# Patient Record
Sex: MALE | Race: WHITE
[De-identification: names, ages, dates, MRNs, and addresses within clinical notes are randomized per-mention and may not be internally consistent; named-entity substitution may affect disease eponyms.]

---

## 2021-10-26 ENCOUNTER — HOSPITAL ENCOUNTER (EMERGENCY)
Dept: HOSPITAL 61 - ER | Age: 66
LOS: 1 days | Discharge: HOME | End: 2021-10-27
Payer: MEDICARE

## 2021-10-26 VITALS — WEIGHT: 211.64 LBS | HEIGHT: 70 IN | BODY MASS INDEX: 30.3 KG/M2

## 2021-10-26 DIAGNOSIS — R42: ICD-10-CM
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
## 2021-10-26 DIAGNOSIS — R51.9: ICD-10-CM

## 2021-10-26 DIAGNOSIS — S93.05XA: ICD-10-CM

## 2021-10-26 DIAGNOSIS — S93.02XA: ICD-10-CM
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
## 2021-10-26 DIAGNOSIS — S82.62XA: Primary | ICD-10-CM

## 2021-10-26 DIAGNOSIS — Z95.1: ICD-10-CM
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
## 2021-10-26 DIAGNOSIS — S82.842A: ICD-10-CM

## 2021-10-26 DIAGNOSIS — M54.2: ICD-10-CM

## 2021-10-26 PROCEDURE — 74177 CT ABD & PELVIS W/CONTRAST: CPT

## 2021-10-26 PROCEDURE — 99285 EMERGENCY DEPT VISIT HI MDM: CPT

## 2021-10-26 PROCEDURE — 96375 TX/PRO/DX INJ NEW DRUG ADDON: CPT

## 2021-10-26 PROCEDURE — 93005 ELECTROCARDIOGRAM TRACING: CPT

## 2021-10-26 PROCEDURE — 71260 CT THORAX DX C+: CPT

## 2021-10-26 PROCEDURE — 84484 ASSAY OF TROPONIN QUANT: CPT

## 2021-10-26 PROCEDURE — 90715 TDAP VACCINE 7 YRS/> IM: CPT

## 2021-10-26 PROCEDURE — 82962 GLUCOSE BLOOD TEST: CPT

## 2021-10-26 PROCEDURE — 90471 IMMUNIZATION ADMIN: CPT

## 2021-10-26 PROCEDURE — 96361 HYDRATE IV INFUSION ADD-ON: CPT

## 2021-10-26 PROCEDURE — 73590 X-RAY EXAM OF LOWER LEG: CPT

## 2021-10-26 PROCEDURE — 85025 COMPLETE CBC W/AUTO DIFF WBC: CPT

## 2021-10-26 PROCEDURE — 73600 X-RAY EXAM OF ANKLE: CPT

## 2021-10-26 PROCEDURE — 73610 X-RAY EXAM OF ANKLE: CPT

## 2021-10-26 PROCEDURE — 80053 COMPREHEN METABOLIC PANEL: CPT

## 2021-10-26 PROCEDURE — 36415 COLL VENOUS BLD VENIPUNCTURE: CPT

## 2021-10-26 PROCEDURE — 96374 THER/PROPH/DIAG INJ IV PUSH: CPT

## 2021-10-26 PROCEDURE — 27788 TREATMENT OF ANKLE FRACTURE: CPT

## 2021-10-26 PROCEDURE — 73562 X-RAY EXAM OF KNEE 3: CPT

## 2021-10-26 PROCEDURE — 72125 CT NECK SPINE W/O DYE: CPT

## 2021-10-26 PROCEDURE — 70450 CT HEAD/BRAIN W/O DYE: CPT

## 2021-10-26 PROCEDURE — 73620 X-RAY EXAM OF FOOT: CPT

## 2021-10-27 VITALS — SYSTOLIC BLOOD PRESSURE: 112 MMHG | DIASTOLIC BLOOD PRESSURE: 59 MMHG

## 2021-10-27 LAB
ALBUMIN SERPL-MCNC: 3.6 G/DL (ref 3.4–5)
ALBUMIN/GLOB SERPL: 1.3 {RATIO} (ref 1–1.7)
ALP SERPL-CCNC: 41 U/L (ref 46–116)
ALT SERPL-CCNC: 31 U/L (ref 16–63)
ANION GAP SERPL CALC-SCNC: 12 MMOL/L (ref 6–14)
AST SERPL-CCNC: 28 U/L (ref 15–37)
BASOPHILS # BLD AUTO: 0 X10^3/UL (ref 0–0.2)
BASOPHILS NFR BLD: 0 % (ref 0–3)
BILIRUB SERPL-MCNC: 0.3 MG/DL (ref 0.2–1)
BUN SERPL-MCNC: 9 MG/DL (ref 8–26)
BUN/CREAT SERPL: 13 (ref 6–20)
CALCIUM SERPL-MCNC: 7.8 MG/DL (ref 8.5–10.1)
CHLORIDE SERPL-SCNC: 100 MMOL/L (ref 98–107)
CO2 SERPL-SCNC: 24 MMOL/L (ref 21–32)
CREAT SERPL-MCNC: 0.7 MG/DL (ref 0.7–1.3)
EOSINOPHIL NFR BLD: 0 % (ref 0–3)
EOSINOPHIL NFR BLD: 0 X10^3/UL (ref 0–0.7)
ERYTHROCYTE [DISTWIDTH] IN BLOOD BY AUTOMATED COUNT: 14.8 % (ref 11.5–14.5)
GFR SERPLBLD BASED ON 1.73 SQ M-ARVRAT: 112.1 ML/MIN
GLUCOSE SERPL-MCNC: 105 MG/DL (ref 70–99)
HCT VFR BLD CALC: 34.9 % (ref 39–53)
HGB BLD-MCNC: 11.6 G/DL (ref 13–17.5)
LYMPHOCYTES # BLD: 0.6 X10^3/UL (ref 1–4.8)
LYMPHOCYTES NFR BLD AUTO: 12 % (ref 24–48)
MCH RBC QN AUTO: 29 PG (ref 25–35)
MCHC RBC AUTO-ENTMCNC: 33 G/DL (ref 31–37)
MCV RBC AUTO: 87 FL (ref 79–100)
MONO #: 0.3 X10^3/UL (ref 0–1.1)
MONOCYTES NFR BLD: 6 % (ref 0–9)
NEUT #: 4.3 X10^3/UL (ref 1.8–7.7)
NEUTROPHILS NFR BLD AUTO: 82 % (ref 31–73)
PLATELET # BLD AUTO: 141 X10^3/UL (ref 140–400)
POTASSIUM SERPL-SCNC: 4.4 MMOL/L (ref 3.5–5.1)
PROT SERPL-MCNC: 6.4 G/DL (ref 6.4–8.2)
RBC # BLD AUTO: 4.03 X10^6/UL (ref 4.3–5.7)
SODIUM SERPL-SCNC: 136 MMOL/L (ref 136–145)
WBC # BLD AUTO: 5.2 X10^3/UL (ref 4–11)

## 2021-10-27 NOTE — RAD
Examination: CT head and cervical spine without contrast





CT HEAD



INDICATION: Fall, pain 



COMPARISON: None Available.



Exposure: One or more of the following individualized dose reduction techniques were utilized for thi
s examination:  1. Automated exposure control  2. Adjustment of the mA and/or kV according to patient
 size  3. Use of iterative reconstruction technique



TECHNIQUE: 5 mm contiguous axial images were obtained from the skull base to the vertex in both bone 
and soft tissue algorithm.  



FINDINGS: 

No abnormal attenuation within the brain parenchyma.  



No evidence of acute intracranial hemorrhage.   No extra-axial fluid collections.



No mass effect or midline shift. Ventricular size is appropriate.  Basal cisterns are patent.



No fractures identified.Gray-white differentiation is preserved.Globes and orbits are within normal l
imits.   Paranasal sinuses and mastoid air cells are clear.   







CT CERVICAL SPINE



INDICATION: Fall, pain



COMPARISON: None Available.



Technique: 2.5 mm contiguous axial images were obtained from the skull base through the cervicothorac
ic junction in both bone and soft tissue algorithm.  Additional sagittal and coronal reconstructions 
were also performed. 



FINDINGS: Vertebral body height and alignment are maintained.  Cervical lordosis is preserved.  The l
ateral masses of C1 are aligned upon C2.  



No fractures identified.  The bony canal is patent throughout.



Moderate intervertebral disc height loss identified in the cervical spine particularly at C4-C5, C5-C
6, C6-C7 with small anterior and posterior osteophyte formation.  



The paraspinous soft tissues are unremarkable.  Visualized intracranial contents are unremarkable.  L
sandrine apices are clear. 



IMPRESSION:



1.  No acute intracranial findings.

2.  No acute fracture cervical spine. Correlate clinically.

3.  Moderate degenerative changes cervical spine.



Electronically signed by: Franki Armando MD (10/27/2021 12:44 AM) [id number]

## 2021-10-27 NOTE — RAD
Examination: 3 views of the left ankle



HISTORY: History of postreduction



COMPARISON: Same day exam 



Findings/

impression:



 Mild lateral displaced fracture of the lateral malleolus again identified. There is persistent later
al subluxation of the talus in the ankle joint which is slightly improved since prior exam.



Electronically signed by: Franki Armando MD (10/27/2021 2:34 AM) [id number]

## 2021-10-27 NOTE — RAD
Examination: CT chest abdomen pelvis with IV contrast, CT thoracic and lumbar spine without contrast





Indication: Reason: fall intox / Spl. Instructions:  / History: 



Technique: Contiguous axial images were obtained through the chest, abdomen, and pelvis after adminis
tration IV contrast. Coronal and sagittal reformations were created. Axial CT images of the thoracic 
and lumbar spine was performed without contrast and coronal sagittal reformats are performed.



Comparison: None



Findings:





The visualized thyroid gland grossly appears unremarkable. Central airways are patent. Mild cardiomeg
melonie. The ascending aorta measures 5 cm in transverse dimension. Neurologically significant mediastina
l lymphadenopathy identified. Mild bibasilar lung atelectasis. Mild decreased attenuation noted in th
e liver likely hepatic steatosis. The spleen, adrenals grossly appears unremarkable. The gallbladder 
is mildly distended. Surgical changes identified in the stomach. The small bowel is nondilated. Feces
 and gas noted in the colon.. Urinary bladder is mildly distended



The bilateral kidneys enhance symmetrically. Minimal compression changes identified in the mid thorac
ic vertebral bodies T7, T8, T9 vertebral bodies likely chronic changes. Moderate intervertebral disc 
height loss identified in the thoracic and lumbar spine likely degenerative changes. Mild thoracic ky
phosis. Bilateral total hip arthroplasty changes.



IMPRESSION:

1.  No acute traumatic findings identified in the chest, abdomen or pelvis.

2.  Mild aneurysmal change ascending aorta.

3.  Hepatic steatosis.

4.  Minimal compression changes identified in the mid thoracic vertebral bodies T7, T8, T9 vertebral 
bodies likely chronic changes.



Electronically signed by: Franki Amrando MD (10/27/2021 4:52 AM) [id number]

## 2021-10-27 NOTE — RAD
Two-view left ankle x2



HISTORY: Status post reduction



Two-view left ankle 1:32 AM



Limited 2 view AP lateral views



COMPARISON: 1:27 AM



There is an oblique fracture of the lateral malleolus at and above the level plafond and with mild la
teral and posterior displacement. There is widening of the medial mortise with lateral subluxation of
 the talus from the tibia.



IMPRESSION:



Bimalleolar fracture with widening of the medial mortise. There is slightly improved anatomic relatio
nship.



2 view left ankle: 3:11 AM



Limited 2 view AP lateral views of the left ankle were obtained



There is fracture of the lateral malleolus at and above the level plafond and with lateral displaceme
nt and there is significant widening of the medial mortise with lateral subluxation of the talus from
 the tibia and valgus angulation.



IMPRESSION:



Bimalleolar fracture dislocation appears mildly worse.



Electronically signed by: Steve Felix III, MD (10/27/2021 7:22 AM) [facility]

## 2021-10-27 NOTE — RAD
Examination: 3 views of the right knee



HISTORY: History of fall



COMPARISON: None available.



Findings: The alignment of the knee joint grossly appears unremarkable. Mild joint space loss identif
ied in the medial, lateral, patellofemoral compartments.



IMPRESSION:



1. Mild tricompartmental degenerative changes.



Electronically signed by: Franki Armando MD (10/27/2021 2:37 AM) [id number]

## 2021-10-27 NOTE — RAD
Examination: CT chest abdomen pelvis with IV contrast, CT thoracic and lumbar spine without contrast





Indication: Reason: fall intox / Spl. Instructions:  / History: 



Technique: Contiguous axial images were obtained through the chest, abdomen, and pelvis after adminis
tration IV contrast. Coronal and sagittal reformations were created. Axial CT images of the thoracic 
and lumbar spine was performed without contrast and coronal sagittal reformats are performed.



Comparison: None



Findings:





The visualized thyroid gland grossly appears unremarkable. Central airways are patent. Mild cardiomeg
melonie. The ascending aorta measures 5 cm in transverse dimension. Neurologically significant mediastina
l lymphadenopathy identified. Mild bibasilar lung atelectasis. Mild decreased attenuation noted in th
e liver likely hepatic steatosis. The spleen, adrenals grossly appears unremarkable. The gallbladder 
is mildly distended. Surgical changes identified in the stomach. The small bowel is nondilated. Feces
 and gas noted in the colon.. Urinary bladder is mildly distended



The bilateral kidneys enhance symmetrically. Minimal compression changes identified in the mid thorac
ic vertebral bodies T7, T8, T9 vertebral bodies likely chronic changes. Moderate intervertebral disc 
height loss identified in the thoracic and lumbar spine likely degenerative changes. Mild thoracic ky
phosis. Bilateral total hip arthroplasty changes.



IMPRESSION:

1.  No acute traumatic findings identified in the chest, abdomen or pelvis.

2.  Mild aneurysmal change ascending aorta.

3.  Hepatic steatosis.

4.  Minimal compression changes identified in the mid thoracic vertebral bodies T7, T8, T9 vertebral 
bodies likely chronic changes.



Electronically signed by: Franki Armando MD (10/27/2021 4:52 AM) [id number]

## 2021-10-27 NOTE — PHYS DOC
Past Medical History


Past Surgical History:  No Surgical History





General Adult


EDM:


Chief Complaint:  ANKLE PROBLEM





HPI:


HPI:


66-year-old male who initially denies any past medical history, resents the ED 

with complaints of left ankle pain stating he rolled his ankle just prior to 

arrival.  Wife is later present at bedside, (patient consents to his/her/their 

knowledge and involvement in pts' medical care), and states she heard a loud 

noise and found patient on the floor.  Patient reports he did not hit his head 

or lose consciousness. States "I tried to walk on it and couldn't." Has a 

history of bilateral hip placements, gastric bypass surgery in  and reports 

he has sweating and dizzy spells, worse at night for the past 4 years.  Does 

drink alcohol daily, 1 bottle of wine a day.  Reports he was currently drinking 

alcohol earlier tonight. No prior injury to his left ankle. Denies any head 

injury or loss of consciousness.  Is not on any anticoagulants.  Takes no 

routine daily prescribed medications.  Denies any other coingestants.  Cannot 

recall last tetanus.





Review of Systems:


Review of Systems:


Constitutional:   Denies fever or chills. []


Eyes:   Denies change in visual acuity. []


HENT:   Denies nasal congestion or sore throat. [] 


Respiratory:   Denies cough or shortness of breath. [] 


Cardiovascular:   Denies chest pain or edema. [] 


GI:   Denies abdominal pain, nausea, vomiting, bloody stools or diarrhea. [] 


:  Denies incontinence or saddle anesthesia


Musculoskeletal:   Denies mildline back pain or flank pain. [] 


Integument:   Denies rash or diaphoresis


Neurologic:   Denies headache, focal weakness or sensory changes. [] 


Endocrine:   Denies polyuria or polydipsia. [] 


Lymphatic:  Denies swollen glands. [] 


Psychiatric:  Denies depression or anxiety. []





Heart Score:


C/O Chest Pain:  No


Risk Factors:


Risk Factors:  DM, Current or recent (<one month) smoker, HTN, HLP, family 

history of CAD, obesity.


Risk Scores:


Score 0 - 3:  2.5% MACE over next 6 weeks - Discharge Home


Score 4 - 6:  20.3% MACE over next 6 weeks - Admit for Clinical Observation


Score 7 - 10:  72.7% MACE over next 6 weeks - Early Invasive Strategies





Current Medications:





Current Medications








 Medications


  (Trade)  Dose


 Ordered  Sig/Mitchlel  Start Time


 Stop Time Status Last Admin


Dose Admin


 


 Diphtheria/


 Tetanus/Acell


 Pertussis


  (ADACEL TDap


 SYRINGE)  0.5 ml  ONCE ONCE  10/27/21 01:00


 10/27/21 01:01 DC 10/27/21 00:54


0.5 ML


 


 Hydromorphone HCl


  (Dilaudid)  1 mg  1X  ONCE  10/27/21 01:00


 10/27/21 01:01 DC 10/27/21 00:52


1 MG


 


 Ketamine HCl


  (Ketamine)  120 mg  1X  ONCE  10/27/21 01:00


 10/27/21 01:01 DC 10/27/21 00:54


120 MG


 


 Sodium Chloride  1,000 ml @ 


 1,000 mls/hr  1X  ONCE  10/27/21 01:30


 10/27/21 02:29  10/27/21 01:26


1,000 MLS/HR











Allergies:


Allergies:





Allergies








Coded Allergies Type Severity Reaction Last Updated Verified


 


  No Known Drug Allergies    10/27/21 No











Physical Exam:


PE:





Constitutional: Well developed, well nourished, no acute distress, non-toxic 

appearance. 


HENT: Normocephalic, atraumatic, moist mucous membranes


Eyes: PERRLA, EOMI, conjunctiva normal, no discharge.  


Neck: Normal range of motion,  supple, no midline neck pain


Cardiovascular: S1/2 present, regular rhythm


Lungs & Thorax: Speaking in full sentences, bilateral equal chest rise, no 

tachypnea or increased work of breathing


Abdomen:  soft, no tenderness, 


Skin: Warm, dry, no erythema, no rash. [] 


Back: No midline spinal step-offs or tenderness, no CVA tenderness. [] 


Extremities: Complains of left ankle pain with weightbearing, no pain in either 

knee or fibular head but both anterior knees have superficial abrasions, left 

ankle with obvious deformity/significant contusions over medial malleoli w/skin 

tenting and superficial skin abrasion, dp/pt intact bl, L5/S1 intact bl, cap 

refill < 1 second, patient moves left ankle/knee hip without any grimace


Neurologic: Alert and oriented X 3, normal motor function, normal sensory 

function, no focal deficits noted, ataxic movements


Psychologic: Affect normal, judgement normal, mood normal. []





Current Patient Data:


Labs:





                                Laboratory Tests








Test


 10/27/21


00:40


 


White Blood Count


 5.2 x10^3/uL


(4.0-11.0)


 


Red Blood Count


 4.03 x10^6/uL


(4.30-5.70)  L


 


Hemoglobin


 11.6 g/dL


(13.0-17.5)  L


 


Hematocrit


 34.9 %


(39.0-53.0)  L


 


Mean Corpuscular Volume


 87 fL ()





 


Mean Corpuscular Hemoglobin 29 pg (25-35)  


 


Mean Corpuscular Hemoglobin


Concent 33 g/dL


(31-37)


 


Red Cell Distribution Width


 14.8 %


(11.5-14.5)  H


 


Platelet Count


 141 x10^3/uL


(140-400)


 


Neutrophils (%) (Auto) 82 % (31-73)  H


 


Lymphocytes (%) (Auto) 12 % (24-48)  L


 


Monocytes (%) (Auto) 6 % (0-9)  


 


Eosinophils (%) (Auto) 0 % (0-3)  


 


Basophils (%) (Auto) 0 % (0-3)  


 


Neutrophils # (Auto)


 4.3 x10^3/uL


(1.8-7.7)


 


Lymphocytes # (Auto)


 0.6 x10^3/uL


(1.0-4.8)  L


 


Monocytes # (Auto)


 0.3 x10^3/uL


(0.0-1.1)


 


Eosinophils # (Auto)


 0.0 x10^3/uL


(0.0-0.7)


 


Basophils # (Auto)


 0.0 x10^3/uL


(0.0-0.2)


 


Sodium Level


 136 mmol/L


(136-145)


 


Potassium Level


 4.4 mmol/L


(3.5-5.1)


 


Chloride Level


 100 mmol/L


()


 


Carbon Dioxide Level


 24 mmol/L


(21-32)


 


Anion Gap 12 (6-14)  


 


Blood Urea Nitrogen


 9 mg/dL (8-26)





 


Creatinine


 0.7 mg/dL


(0.7-1.3)


 


Estimated GFR


(Cockcroft-Gault) 112.1  





 


BUN/Creatinine Ratio 13 (6-20)  


 


Glucose Level


 105 mg/dL


(70-99)  H


 


Calcium Level


 7.8 mg/dL


(8.5-10.1)  L


 


Total Bilirubin


 0.3 mg/dL


(0.2-1.0)


 


Aspartate Amino Transferase


(AST) 28 U/L (15-37)





 


Alanine Aminotransferase (ALT)


 31 U/L (16-63)





 


Alkaline Phosphatase


 41 U/L


()  L


 


Total Protein


 6.4 g/dL


(6.4-8.2)


 


Albumin


 3.6 g/dL


(3.4-5.0)


 


Albumin/Globulin Ratio 1.3 (1.0-1.7)  


 


Ethyl Alcohol Level


 241 mg/dL


(0-10)  H





                                Laboratory Tests


10/27/21 00:40








                                Laboratory Tests


10/27/21 00:40








Vital Signs:





                                   Vital Signs








  Date Time  Temp Pulse Resp B/P (MAP) Pulse Ox O2 Delivery O2 Flow Rate FiO2


 


10/27/21 00:52   18  98   


 


10/27/21 00:15 98.3 98  122/61 (81)  Room Air  





 98.3       











EKG:


EKG:


Sinus rhythm 59 bpm, left axis deviation, first-degree AV block with IA interval

 202, QTc 46, T wave inversion lead III and aVF, no ST elevation or ST depres

tonia, patient does not have any chest pain





Radiology/Procedures:


Radiology/Procedures:


                                 IMAGING REPORT





                                     Signed





PATIENT: MERLE MCALLISTERCCOUNT: [id number]     MRN#: [id number]


: 1955           LOCATION: ER              AGE: 66


SEX: M                    EXAM DT: 10/27/21         ACCESSION#: [id number]


STATUS: REG ER            ORD. PHYSICIAN: KIM RAMOS DO


REASON: ankle pain


PROCEDURE: CT HEAD AND CERVICAL SPINE WO





Examination: CT head and cervical spine without contrast








CT HEAD





INDICATION: Fall, pain 





COMPARISON: None Available.





Exposure: One or more of the following individualized dose reduction techniques 

were utilized for this examination:  1. Automated exposure control  2. 

Adjustment of the mA and/or kV according to patient size  3. Use of iterative 

reconstruction technique





TECHNIQUE: 5 mm contiguous axial images were obtained from the skull base to the

 vertex in both bone and soft tissue algorithm.  





FINDINGS: 


No abnormal attenuation within the brain parenchyma.  





No evidence of acute intracranial hemorrhage.   No extra-axial fluid 

collections.





No mass effect or midline shift. Ventricular size is appropriate.  Basal cist

erns are patent.





No fractures identified.Gray-white differentiation is preserved.Globes and 

orbits are within normal limits.   Paranasal sinuses and mastoid air cells are 

clear.   











CT CERVICAL SPINE





INDICATION: Fall, pain





COMPARISON: None Available.





Technique: 2.5 mm contiguous axial images were obtained from the skull base 

through the cervicothoracic junction in both bone and soft tissue algorithm.  

Additional sagittal and coronal reconstructions were also performed. 





FINDINGS: Vertebral body height and alignment are maintained.  Cervical lordosis

 is preserved.  The lateral masses of C1 are aligned upon C2.  





No fractures identified.  The bony canal is patent throughout.





Moderate intervertebral disc height loss identified in the cervical spine 

particularly at C4-C5, C5-C6, C6-C7 with small anterior and posterior osteophyte

 formation.  





The paraspinous soft tissues are unremarkable.  Visualized intracranial contents

 are unremarkable.  Lung apices are clear. 





IMPRESSION:





1.  No acute intracranial findings.


2.  No acute fracture cervical spine. Correlate clinically.


3.  Moderate degenerative changes cervical spine.





Electronically signed by: Franki Armando MD (10/27/2021 12:44 AM) [id number]














DICTATED and SIGNED BY:     FRANKI ARMANDO MD


DATE:     10/27/21 [id number] 0


                                 IMAGING REPORT





                                     Signed





PATIENT: MERLE MCALLISTERCCOUNT: [id number]     MRN#: [id number]


: 1955           LOCATION: ER              AGE: 66


SEX: M                    EXAM DT: 10/27/21         ACCESSION#: [id number]


STATUS: REG ER            ORD. PHYSICIAN: KIM RAMOS DO


REASON: ankle pain


PROCEDURE: FOOT LEFT 2V





Examination: 3 views of the left knee, 2 views of the left tibia and fibula, 3 

views of the left ankle, 2 views of the left foot





HISTORY: History of fall, ankle pain





COMPARISON: Available





FINDINGS:





The alignment of the knee joint grossly appears unremarkable. Small knee joint 

effusion. There is mild lateral displaced fracture of the lateral malleolus. 

There is lateral subluxation/dislocation of the talus in the ankle joint with 

widening of the medial clear space. Moderate soft tissue swelling identified 

lateral to lateral malleolus. Mild hallux valgus. The alignment of the tarsal 

bones, tarsometatarsal joints, metatarsophalangeal, interphalangeal grossly 

appears unremarkable.





IMPRESSION:


Mild lateral displaced fracture of the lateral malleolus with lateral 

subluxation/dislocation of the talus in the ankle joint with widening of the 

medial clear space. Moderate soft tissue swelling lateral to lateral malleolus.





Electronically signed by: Franki Armando MD (10/27/2021 1:09 AM) [email or web address]














DICTATED and SIGNED BY:     FRANKI ARMANDO MD


DATE:     10/27/21 [id number] 0


                                 IMAGING REPORT





                                     Signed





PATIENT: LISA MCALLISTER: [id number]     MRN#: [id number]


: 1955           LOCATION: ER              AGE: 66


SEX: M                    EXAM DT: 10/27/21         ACCESSION#: [id number]


STATUS: REG ER            ORD. PHYSICIAN: KIM RAMOS DO


REASON: POST REDUCTION #1


PROCEDURE: ANKLE LEFT 3V





Examination: 3 views of the left ankle





HISTORY: History of postreduction





COMPARISON: Same day exam 





Findings/


impression:





 Mild lateral displaced fracture of the lateral malleolus again identified. 

There is persistent lateral subluxation of the talus in the ankle joint which is

 slightly improved since prior exam.





Electronically signed by: Franki Armando MD (10/27/2021 2:34 AM) [email or web address]














DICTATED and SIGNED BY:     FRANKI ARMANDO MD


DATE:     10/27/21 [id number] 0


                                 IMAGING REPORT





                                     Signed





PATIENT: LISA MCALLISTER: [id number]     MRN#: [id number]


: 1955           LOCATION: ER              AGE: 66


SEX: M                    EXAM DT: 10/27/21         ACCESSION#: [id number]


STATUS: REG ER            ORD. PHYSICIAN: KIM RAMOS DO


REASON: FALL


PROCEDURE: KNEE RIGHT 3V





Examination: 3 views of the right knee





HISTORY: History of fall





COMPARISON: None available.





Findings: The alignment of the knee joint grossly appears unremarkable. Mild 

joint space loss identified in the medial, lateral, patellofemoral compartments.





IMPRESSION:





1. Mild tricompartmental degenerative changes.





Electronically signed by: Franki Armando MD (10/27/2021 2:37 AM) [email or web address]














DICTATED and SIGNED BY:     FRANKI ARMANDO MD


DATE:     10/27/21 [id number] 0


                                        


                                 IMAGING REPORT





                                     Signed





PATIENT: LISA MCALLISTER: [id number]     MRN#: [id number]


: 1955           LOCATION: ER              AGE: 66


SEX: M                    EXAM DT: 10/27/21         ACCESSION#: [id number]


STATUS: REG ER            ORD. PHYSICIAN: KIM RAMOS DO


REASON: intox fall, r/o trauma, htyoptensiove


PROCEDURE: CT CHEST ABD PELVIS W/CONTRAST





Examination: CT chest abdomen pelvis with IV contrast, CT thoracic and lumbar 

spine without contrast








Indication: Reason: fall intox / Spl. Instructions:  / History: 





Technique: Contiguous axial images were obtained through the chest, abdomen, and

 pelvis after administration IV contrast. Coronal and sagittal reformations were

 created. Axial CT images of the thoracic and lumbar spine was performed without

 contrast and coronal sagittal reformats are performed.





Comparison: None





Findings:








The visualized thyroid gland grossly appears unremarkable. Central airways are 

patent. Mild cardiomegaly. The ascending aorta measures 5 cm in transverse 

dimension. Neurologically significant mediastinal lymphadenopathy identified. 

Mild bibasilar lung atelectasis. Mild decreased attenuation noted in the liver 

likely hepatic steatosis. The spleen, adrenals grossly appears unremarkable. The

 gallbladder is mildly distended. Surgical changes identified in the stomach. 

The small bowel is nondilated. Feces and gas noted in the colon.. Urinary 

bladder is mildly distended





The bilateral kidneys enhance symmetrically. Minimal compression changes 

identified in the mid thoracic vertebral bodies T7, T8, T9 vertebral bodies 

likely chronic changes. Moderate intervertebral disc height loss identified in 

the thoracic and lumbar spine likely degenerative changes. Mild thoracic 

kyphosis. Bilateral total hip arthroplasty changes.





IMPRESSION:


1.  No acute traumatic findings identified in the chest, abdomen or pelvis.


2.  Mild aneurysmal change ascending aorta.


3.  Hepatic steatosis.


4.  Minimal compression changes identified in the mid thoracic vertebral bodies 

T7, T8, T9 vertebral bodies likely chronic changes.





Electronically signed by: Franki Armando MD (10/27/2021 4:52 AM) [email or web address]














DICTATED and SIGNED BY:     FRANKI ARMANDO MD


DATE:     10/27/21 [id number] 0





Indication: Joint dislocation





Consent: Consent was obtained.





Procedure: The pre-reduction exam showed distal perfusion and neurologic 

function to be normal.. The patient was placed in the appropriate position. 

Anesthesia/pain control with dilaudid (was heavily intoxicated and tolerated 

reduction without any procedural sedation). Reduction of the of the left lateral

 ankle was performed by distraction and medial movement of talus. Post reduction

 films were obtained and revealed slight improvement of subluxed talus. A post-

reduction exam revealed distal perfusion and neurologic function to be normal. 

The affected area was immobilized with a posterior ankle splint and stirrup 

splint. Crutches provided





The patient tolerated the procedure well.





Complications: none.











Patient and wife informed of findings.  Splint applied by medic and myself.  The

 splint is checked by myself, with appropriate stabilization of the injury.  

Distal capillary refill normal and distal neurologic function intact. Pt later 

c/o his foot "falling asleep" and splint was re-wrapped with resolution of 

symptoms.





Course & Med Decision Making:


Course & Med Decision Making


Pertinent Labs and Imaging studies reviewed. (See chart for details)





Concern for closed left lateral malleoli fracture with significant ankle 

contusions and talar subluxation-unable to improve this after multiple attempts-

patient continues to move left ankle with no significant pain.  Patient became 

hypotensive after 1 mg of Dilaudid and a fluid bolus was started.  Hours later 

patient became more hypotensive and a 2000 cc bolus was administered. Pt was 

re-scanned to assess for trauma. Pt continued to have no back or abdominal pain,

 was neurovascularly intact with no severe pain of left lower extremity. Ct 

images not concerning for any new trauma or hemorrhage.  Patient's blood 

pressure improved, could be vasovagal near syncope versus alcohol related. Pt 

provided with crutches and analgesic instructions. Will discharge home with 

strict ED return precautions were given for severe pain, repeat injury, 

neurologic deficits, confusion or altered mental status. Encouraged urgent 

outpatient follow-up with PMD and orthopedic surgery within 1 week.  Life-

threatening processes were considered but are low suspicion at this time, given 

history, physical exam and ED workup. Pt was educated on all prescription 

medications and adverse effects.  All patient's questions were answered and pt 

was stable at time of discharge.





Life/limb-threatening differential includes but is not limited to, intracranial 

hemorrhage, diffuse axonal injury, spinal cord syndrome, unstable cervical 

fracture or SCIWORA, fractures or joint dislocations, neurovascular injuries, 

organ injury or laceration, pneumothorax, pneumoperitoneum, pericardial 

tamponade, unstable pelvic fracture, compartment syndrome, flail chest or 

respiratory distress, burn injury or asphyxiation





I have spoken with the patient and/or caregivers.  I explained the patient's 

condition, diagnoses and treatment plan based on the information available to me

 at this time.  I have answered the patient and/or caregiver's questions and 

addressed any concerns.  The patient and/or caregivers have a good understanding

 of patient's diagnosis, condition and treatment plan as can be expected at this

 point.  Vital signs have been stable.  Patient's condition is stable and 

appropriate for discharge from the emergency department. 





Patient will pursue further outpatient evaluation with primary care physician or

 other designated or consulting physician as outlined in the discharge 

instructions.  The patient and/or caregivers are agreeable to this plan of care 

and follow-up instructions have been explained in detail.  The patient and/or 

caregivers have received these instructions in written form and have expressed 

an understanding of the discharge instructions.  The patient and/or caregivers 

are aware that any significant change of condition or worsening of symptoms 

should prompt immediate return to this or the closest emergency department or 

call to 911.





Jennifer Disclaimer:


Dragon Disclaimer:


This electronic medical record was generated, in whole or in part, using a voice

 recognition dictation system.





Departure


Departure


Impression:  


   Primary Impression:  


   Alcohol intoxication


   Additional Impressions:  


   Need for Tdap vaccination


   Fracture of lateral malleolus of left ankle


   Subluxation of left ankle joint


Disposition:   HOME / SELF CARE / HOMELESS


Condition:  STABLE


Referrals:  


BOOGIE LIPSCOMB (PCP)


Follow-up with your primary care physician for routine care OR


FOLLOW UP WITH FAMILY MEDICINE:


[address] [facility], [address]


[place] [address]


Phone: [phone]


Patient Instructions:  Alcohol Intoxication, Ankle Dislocation, Displaced 

Fibular Fracture (Adult, Ankle) Treated with ORIF, VIS, Tetanus, Diphtheria 

(Td); Tetanus, Diphtheria, Pertussis (Tdap) - CDC





Additional Instructions:  


FOLLOW UP WITH ORTHOPEDICS:  FOR DEFINITIVE MANAGEMENT of ankle fracture within 

1 week 


Orthopaedic Surgery


[address], [address]


[place] [address]


Phone: [phone]





EMERGENCY DEPARTMENT GENERAL DISCHARGE INSTRUCTIONS





Thank you for coming to [facility] Emergency Department (ED) 

today and 


trusting us with you care.  We trust that you had a positive experience in our 

Emergency 


Department.  If you wish to speak to the department management, you may call the

 Director at 


[phone].





YOUR FOLLOW UP INSTRUCTIONS ARE AS FOLLOWS:





1.  Do you have a private Doctor?  If you do not have a private doctor, please 

ask for a 


resource list of physicians or clinics that may be able to assist you with 

follow up care.





2.  The Emergency Physicain has interpreted your x-rays.  The X-Ray specialist 

will also 


review them.  If there is a change in the findings, you will be notified in 48 

hours when at 


all possible.





3.  A lab test or culture has been done, your results will be reviewed and you 

will be 


notified if you need a change in treatment.





ADDITIONAL INSTRUCTIONS AND INFORMATION:





1.  Your care today has been supervised by a physician who is specially trained 

in emergency 


care.  Many problems require more than one evaluation for a complete diagnosis 

and 


treatment.  We recommend that you schedule your follow up appointment as 

recommended to 


ensure complete treatment of you illness or injury.  If you are unable to obtain

 follow up 


care and continue to have a problem, or if your condition worsens, we recommend 

that you 


return to the ED.





2.  We are not able to safely determine your condition over the phone nor are we

 able to 


give sound medical advice over the phone.  For these safety reasons, if you call

 for medical 


advice we will ask you to come to the ED for further evaluation.





3.  If you have any questions regarding these discharge instructions please call

 the ED at 


[phone].





SAFETY INFORMATION:





In the interest of safety, wellness, and injury prevention; we encourage you to 

wear your 


sealbelt, if you smoke; quite smoking, and we encourage family to use a 

protective helmet 


for bicycling and other sporting events that present an increased risk for head 

injury.





IF YOUR SYMPTOMS WORSEN OR NEW SYMPTOMS DEVELOP, OR YOU HAVE CONCERNS ABOUT YOUR

 CONDITION; 


OR IF YOUR CONDITION WORSENS WHILE YOU ARE WAITING FOR YOUR FOLLOW UP 

APPOINTMENT; EITHER 


CONTACT YOUR PRIMARY CARE DOCTOR, THE PHYSICIAN WHOSE NAME AND NUMBER YOU WERE 

GIVEN, OR 


RETURN TO THE ED IMMEDIATELY.


Scripts


Hydrocodone Bit/Acetaminophen (HYDROCODONE-APAP 5-325  **) 1 Tab Tablet


1 TAB PO PRN Q6HRS PRN for PAIN for 4 Days, #16 TAB 0 Refills


   Prov: KIM RAMOS DO         10/27/21











KIM RAMOS DO               Oct 27, 2021 01:39

## 2021-10-27 NOTE — RAD
Examination: 3 views of the left knee, 2 views of the left tibia and fibula, 3 views of the left ankl
e, 2 views of the left foot



HISTORY: History of fall, ankle pain



COMPARISON: Available



FINDINGS:



The alignment of the knee joint grossly appears unremarkable. Small knee joint effusion. There is mil
d lateral displaced fracture of the lateral malleolus. There is lateral subluxation/dislocation of th
e talus in the ankle joint with widening of the medial clear space. Moderate soft tissue swelling alden
ntified lateral to lateral malleolus. Mild hallux valgus. The alignment of the tarsal bones, tarsomet
atarsal joints, metatarsophalangeal, interphalangeal grossly appears unremarkable.



IMPRESSION:

Mild lateral displaced fracture of the lateral malleolus with lateral subluxation/dislocation of the 
talus in the ankle joint with widening of the medial clear space. Moderate soft tissue swelling later
al to lateral malleolus.



Electronically signed by: Franki Armando MD (10/27/2021 1:09 AM) [id number]

## 2021-10-27 NOTE — EKG
[facility]

              [address] [place] [address]

Test Date:    2021-10-27               Test Time:    03:52:50

Pat Name:     HERNÁN MCALLISTER   Department:   

Patient ID:   [id number]           Room:          

Gender:       M                        Technician:   

:          1955               Requested By: KIM RAMOS

Order Number: [id number]           Reading MD:   Jorge Luis Bryson

                                 Measurements

Intervals                              Axis          

Rate:         59                       P:            28

KS:           202                      QRS:          -20

QRSD:         112                      T:            -8

QT:           496                                    

QTc:          496                                    

                           Interpretive Statements

SINUS RHYTHM

LEFTWARD AXIS

T ABNORMALITY IN INFERIOR LEADS

PROLONGED QT

ABNORMAL ECG

RI6.02

No previous ECG available for comparison

Electronically Signed On 10- 15:54:51 CDT by Jorge Luis Bryson

## 2021-11-02 ENCOUNTER — HOSPITAL ENCOUNTER (INPATIENT)
Dept: HOSPITAL 61 - SURG | Age: 66
LOS: 3 days | Discharge: NURSING, REHAB OR LONG-TERM CARE | DRG: 494 | End: 2021-11-05
Attending: INTERNAL MEDICINE | Admitting: INTERNAL MEDICINE
Payer: MEDICARE

## 2021-11-02 VITALS — SYSTOLIC BLOOD PRESSURE: 123 MMHG | DIASTOLIC BLOOD PRESSURE: 60 MMHG

## 2021-11-02 VITALS — BODY MASS INDEX: 30.11 KG/M2 | HEIGHT: 70 IN | WEIGHT: 210.32 LBS

## 2021-11-02 VITALS — DIASTOLIC BLOOD PRESSURE: 74 MMHG | SYSTOLIC BLOOD PRESSURE: 112 MMHG

## 2021-11-02 VITALS — SYSTOLIC BLOOD PRESSURE: 122 MMHG | DIASTOLIC BLOOD PRESSURE: 74 MMHG

## 2021-11-02 VITALS — SYSTOLIC BLOOD PRESSURE: 97 MMHG | DIASTOLIC BLOOD PRESSURE: 61 MMHG

## 2021-11-02 VITALS — DIASTOLIC BLOOD PRESSURE: 63 MMHG | SYSTOLIC BLOOD PRESSURE: 100 MMHG

## 2021-11-02 VITALS — SYSTOLIC BLOOD PRESSURE: 119 MMHG | DIASTOLIC BLOOD PRESSURE: 71 MMHG

## 2021-11-02 VITALS — SYSTOLIC BLOOD PRESSURE: 109 MMHG | DIASTOLIC BLOOD PRESSURE: 66 MMHG

## 2021-11-02 DIAGNOSIS — M53.3: ICD-10-CM

## 2021-11-02 DIAGNOSIS — Z20.822: ICD-10-CM

## 2021-11-02 DIAGNOSIS — Z98.84: ICD-10-CM
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
## 2021-11-02 DIAGNOSIS — Z96.643: ICD-10-CM
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
## 2021-11-02 DIAGNOSIS — S82.842A: Primary | ICD-10-CM
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
## 2021-11-02 PROCEDURE — A6455 SELF-ADHER BAND >=5"/YD: HCPCS

## 2021-11-02 PROCEDURE — A6457 TUBULAR DRESSING: HCPCS

## 2021-11-02 PROCEDURE — 76000 FLUOROSCOPY <1 HR PHYS/QHP: CPT

## 2021-11-02 PROCEDURE — C1713 ANCHOR/SCREW BN/BN,TIS/BN: HCPCS

## 2021-11-02 PROCEDURE — A4930 STERILE, GLOVES PER PAIR: HCPCS

## 2021-11-02 PROCEDURE — 72220 X-RAY EXAM SACRUM TAILBONE: CPT

## 2021-11-02 PROCEDURE — A6223 GAUZE >16<=48 NO W/SAL W/O B: HCPCS

## 2021-11-02 PROCEDURE — 73610 X-RAY EXAM OF ANKLE: CPT

## 2021-11-02 PROCEDURE — G0378 HOSPITAL OBSERVATION PER HR: HCPCS

## 2021-11-02 PROCEDURE — A6402 STERILE GAUZE <= 16 SQ IN: HCPCS

## 2021-11-02 PROCEDURE — U0003 INFECTIOUS AGENT DETECTION BY NUCLEIC ACID (DNA OR RNA); SEVERE ACUTE RESPIRATORY SYNDROME CORONAVIRUS 2 (SARS-COV-2) (CORONAVIRUS DISEASE [COVID-19]), AMPLIFIED PROBE TECHNIQUE, MAKING USE OF HIGH THROUGHPUT TECHNOLOGIES AS DESCRIBED BY CMS-2020-01-R: HCPCS

## 2021-11-02 PROCEDURE — A6253 ABSORPT DRG > 48 SQ IN W/O B: HCPCS

## 2021-11-02 PROCEDURE — A6450 LT COMPRES BAND >=5"/YD: HCPCS

## 2021-11-02 PROCEDURE — 0QSK04Z REPOSITION LEFT FIBULA WITH INTERNAL FIXATION DEVICE, OPEN APPROACH: ICD-10-PCS | Performed by: STUDENT IN AN ORGANIZED HEALTH CARE EDUCATION/TRAINING PROGRAM

## 2021-11-02 PROCEDURE — 0QSH04Z REPOSITION LEFT TIBIA WITH INTERNAL FIXATION DEVICE, OPEN APPROACH: ICD-10-PCS | Performed by: STUDENT IN AN ORGANIZED HEALTH CARE EDUCATION/TRAINING PROGRAM

## 2021-11-02 PROCEDURE — A6449 LT COMPRES BAND >=3" <5"/YD: HCPCS

## 2021-11-02 PROCEDURE — A4209 5+ CC STERILE SYRINGE&NEEDLE: HCPCS

## 2021-11-02 RX ADMIN — MORPHINE SULFATE PRN MG: 2 INJECTION, SOLUTION INTRAMUSCULAR; INTRAVENOUS at 16:57

## 2021-11-02 RX ADMIN — MORPHINE SULFATE PRN MG: 2 INJECTION, SOLUTION INTRAMUSCULAR; INTRAVENOUS at 16:47

## 2021-11-02 RX ADMIN — CHOLECALCIFEROL CAP 125 MCG (5000 UNIT) SCH UNIT: 125 CAP at 21:36

## 2021-11-02 RX ADMIN — ACETAMINOPHEN SCH MG: 325 TABLET, FILM COATED ORAL at 21:36

## 2021-11-02 RX ADMIN — FENTANYL CITRATE PRN MCG: 50 INJECTION INTRAMUSCULAR; INTRAVENOUS at 17:58

## 2021-11-02 RX ADMIN — GABAPENTIN SCH MG: 100 CAPSULE ORAL at 21:36

## 2021-11-02 RX ADMIN — FENTANYL CITRATE PRN MCG: 50 INJECTION INTRAMUSCULAR; INTRAVENOUS at 17:43

## 2021-11-02 NOTE — NUR
PT ARRIVED TO THE FLOOR AT 1845

-------------------------------------------------------------------------------

Addendum: 11/02/21 at 1949 by BAM ADKINS RN

-------------------------------------------------------------------------------

Amended: Links added.

## 2021-11-02 NOTE — PDOC1
History and Physical


Date of Service:


DOS:


DATE: 11/2/21 


TIME: 13:02





Chief Complaint:


Chief Complain:


Fall with left ankle fracture





History of Present Illness:


HPI:


66-year-old male with past medical history of gastric bypass and bilateral hip 

replacements who presents to outpatient surgery for left ankle fracture on 

10/26/2021 where patient sustained a fall from standing.  Patient was unable to 

bear weight after the incident.  ED evaluation and x-ray showed bimalleolar 

fracture of the ankle.  Patient was then closed reduced and splinted and was 

placed on nonweightbearing restriction.  Pain is currently 8 out of 10 in the 

outpatient setting around the ankle.  He presents today for ORIF with Dr. Eric. 

Denies fevers, nausea vomiting, chest pain, abdominal pain, complications with 

anesthesia, complications with bleeding.





Past Medical/Surgical History:


PMH/PSH:


Gastric bypass in 2004, bilateral hip placements in 2005.  Patient is Covid 

vaccinated





Allergies:


Allergies:  


Coded Allergies:  


     No Known Drug Allergies (Unverified , 11/2/21)





Family History:


Family History:


Reviewed with no relevant findings





Social History:


Social History:


Denies alcohol, tobacco or drug abuse





Current Medications:


Current Medications





Current Medications


Fentanyl Citrate (Fentanyl 2ml Vial) 25 mcg PRN Q5MIN  PRN IVP MILD PAIN 1-3;  

Start 11/2/21 at 10:45;  Stop 11/2/21 at 19:00


Fentanyl Citrate (Fentanyl 2ml Vial) 50 mcg PRN Q5MIN  PRN IVP MODERATE PAIN 4-

6;  Start 11/2/21 at 10:45;  Stop 11/2/21 at 19:00


Morphine Sulfate (Morphine Sulfate) 1 mg PRN Q10MIN  PRN IVP SEVERE PAIN 7-10;  

Start 11/2/21 at 10:45;  Stop 11/2/21 at 19:00


Ringer's Solution 1,000 ml @  30 mls/hr Q24H IV ;  Start 11/2/21 at 11:00;  Stop

11/2/21 at 19:00


Hydromorphone HCl (Dilaudid) 0.5 mg PRN Q10MIN  PRN IVP SEVERE PAIN 7-10, 2nd 

CHOICE;  Start 11/2/21 at 10:45;  Stop 11/2/21 at 19:00


Prochlorperazine Edisylate (Compazine) 5 mg PACU PRN  PRN IVP NAUSEA, MRX1;  

Start 11/2/21 at 10:45;  Stop 11/2/21 at 19:00


Cefazolin Sodium/ Dextrose 50 ml @  100 mls/hr 1X PREOP  PRN IV PRIOR TO 

PROCEDURE;  Start 11/2/21 at 13:00;  Stop 11/3/21 at 12:59





Active Scripts


Active


Hydrocodone-Apap 5-325  ** (Hydrocodone Bit/Acetaminophen) 1 Tab Tablet 1 Tab PO

PRN Q6HRS PRN 4 Days


Reported


Cephalexin 500 Mg Tablet 500 Mg PO BID


Tylenol (Acetaminophen) 325 Mg Tablet 325 Mg PO PRN PRN





ROS:


Review of Systems


Review of System


REVIEW OF SYSTEMS:


GENERAL:  Denies weakness


SKIN:  No bruising, hair changes or rashes.


EYES:  No blurred, double or loss of vision.


NOSE AND THROAT:  No history of nosebleeds, hoarseness or sore throat.


HEART:  No history of palpitations, chest pain or shortness of breath on


exertion.


LUNGS:  Denies cough, hemoptysis, wheezing or shortness of breath.


GASTROINTESTINAL:  Denies changes in appetite, nausea, vomiting, diarrhea or


constipation.


GENITOURINARY:  No history of frequency, urgency, hesitancy or nocturia.


NEUROLOGIC:  Denies history of numbness, tingling, or tremor.


PSYCHIATRIC:  No history of panic, anxiety or depression.


ENDOCRINE:  No history of heat or cold intolerance, polyuria or polydipsia.


EXTREMITIES: Positive left ankle pain





Physical Exam:


Vital Signs:





Vital Signs








  Date Time  Temp Pulse Resp B/P (MAP) Pulse Ox O2 Delivery O2 Flow Rate FiO2


 


11/2/21 12:41 98.5 85 20 139/76 97 Room Air  





 98.5       








Physcial Exam:


General: Well developed, well nourished, no acute distress, well appearing


HEENT:  Pupils equally round and reactive to light, EOMI, no discharge, normal 

conjunctiva


Neck:  Supple, no nuchal rigidity, no JVD, trachea midline, no tenderness


Cardiac:  RRR, no murmurs, no gallops, no rubs


Chest/Lungs:  CTAB, no wheeze, no rhonchi, no crackles


Abdomen: soft, non-distended, no guarding, no peritoneal signs, non-tender 


Back:  No tenderness


Extremities: Left closed reduction cast intact.  Warm extremities.  no edema, 

pulses intact, non-tender,capillary refill <3 sec bilateral upper and lower 

extremities,


Neuro:  Alert and oriented x 4, no focal deficits, normal speech





Labs:


Labs:


Labs from 10/27/2021 reviewed.  Unremarkable except for some mild anemia





Images:


Images


PROCEDURE: ANKLE LEFT 3V





XR EXAM OF ANKLE_LEFT 3V





Clinical indications: Reason: F/U LT ANKLE FX / Spl. Instructions:  / History: 





COMPARISON: October 20, 2021.





Findings/


impression: Again seen is an oblique fracture of the distal left fibular 

metadiaphysis with lateral and posterior displacement of the distal fracture 

segment with respect to the fibular shaft. No healing reaction is seen. There is

widening of the medial aspect of the mortise ankle joint consistent with 

disruption of the deltoid ligament. There is mild lateral subluxation of the 

talus within the mortise ankle joint as a result. No lytic process is seen.





Assessment/Plan


Assessment/Plan


Acute left trimalleolar fracture pending ORIF, Alvarado score of less than 0.2%


Obesity class I


History of gastric bypass








Admit to hospitalist service after surgery with Dr. Hernesto ENCARNACION n.p.o. pain control


PT OT after surgery


Will defer to podiatry for DVT prophylaxis


N.p.o.


CODE STATUS full


Discussed with RN and SW


Disposition on-call to the OR


DPOA: Wife





Justifications for Admission


Other Justification














ONUR JACOBSEN MD                   Nov 2, 2021 13:14

## 2021-11-02 NOTE — RAD
XR EXAM OF ANKLE_LEFT 3V



History: Postoperative.



Comparison: 11/2/2021, 11/1/2021



Technique: 3 portable views the left ankle and splint



FINDINGS/

IMPRESSION: 

Splint material limits visualization of detail. There has been intramedullary nail fixation of distal
 fibular fracture with 2 syndesmotic repair screws. Alignment appears anatomic. The talar dome is int
act. Diffuse soft tissue swelling. Skin staples at the lateral ankle. 



Electronically signed by: Ferdinand Pederson MD (11/2/2021 5:06 PM) [id number]

## 2021-11-02 NOTE — PDOC4
OPERATIVE NOTE


Date:


Date:  Nov 2, 2021





Pre-Op Diagnosis:


 trimalleolar fracture of the left ankle complicated with fracture blisters, 

unstable fracture fragment, multiple close reduction and mechanical falls while 

immobilized in a modified Dash compression dressing





Post-Op Diagnosis:


Same as above





Procedure Performed:


Left ankle open reduction internal fixation with syndesmotic joint stabilization





Surgeon:


Raman Miles DPM





Anesthesia Type:


General





Blood Loss:


20 cc





Specimans Obtained:


None





Findings:


Oblique distal fibula fracture extending from the joint level posteriorly.  

Significant diastasis at the medial gutter space and also widening at the sy

ndesmotic joint.  Small extra-articular posterior tibial malleolus fracture.





Complications:


None





Operative Note:


Under mild sedation, patient was brought to the operating room and placed on the

operating table in a supine position. A time out was performed, to confirm 

patient's identity, location of surgery, and procedure. After induction of 

general anesthesia and intravenous antibiotics, a well-padded pneumatic thigh 

tourniquet was placed onto left lower extremity.  The left lower extremity was 

scrubbed, prepped and draped in the usual sterile manner.  Under intraoperative 

x-ray guidance, the distal medial and lateral malleoli, the fracture apices, 

center axial fibula were identified and marked for preoperative incision and 

surgical planning.  An Esmarch bandage was utilized to exsanguinate the lower 

leg, and the tourniquet was inflated to 250 mmHg.





Clinically, there was moderate amount of ecchymosis to the anterior, medial 

ankle and minimally to the lateral fibula.  The serous fracture blisters were 

all deflated without any sanguinous blisters.  There was no opening, drainage or

fluctuance to the skin envelope. Attention was directed to the distal fibula 

fracture site where a closed reduction was attempted with a percutaneous sharp 

reduction clamp.  Reduction was inadequate.  Then the decision was made to mini 

open the fracture site to allow adequate fracture reduction well preserving the 

skin envelope.oblique fibula fracture site where a 3 mm linear incision was made

over the lateral fibular overlying the fracture site.  The incision was carried 

deep with a combination of sharp and blunt dissection with care to protect and 

retract all neurovascular bundles.  At this time, an oblique fibula fracture was

visualized.  The fracture site was irrigated with copious saline solution.  The 

hematoma was evacuated with a rongeur.  All the invaginated periosteum was freed

from the fracture site.  Under direct visualization, the fracture was reduced 

with 2 lobster clamps.  Intraoperative x-ray remarked adequate fibular length 

restoration and posterior spike reduction. Then a 1 cm linear incision was made 

to the distal tip of the fibula.  Using a combination of sharp and blunt 

dissection, the incision was carried deep to the periosteal layer to the distal 

fibular fossa.  Under intraoperative x-ray guidance, a 1.6 mm guidewire was 

introdiced proximally within the intramedullary canal of the fibula passing the 

fracture site.  The position of the guidewire was confirmed both on lateral and 

AP of the ankle x-ray. A 6.2 mm tapered reamer over the guidewire was used 

through the tissue protector for the distal reaming.  The flutes were buried at 

least 3 mm within the distal fibula.  Fracture site remained stable and well 

reduced.  Then a 3.2 mm reamer was isolated proximally over the guidewire 

through the tissue protector until the depth indicator collar was well within 

the distal fibula.  Adequate chatter was noted proximally.  And fracture 

reduction was maintained confirmed on x-ray.  The guidewire was removed and 

fibula lock and outer jig were introduced proximally to the satisfactory depth 

where a 1.6 mm K wire was introduced from lateral to medial through the outer 

jig "end hole" and confirmed the distal portion of the nail was flush and well 

countersunk in the fibula.  The 1.6 mm K wire was advanced into the distal tibia

as one point of fixation.  The syndesmotic screw holes were also noted within 

satisfactory position.  Then, a 1.6 mm K wire was advanced through the 

provisional syndesmotic screw hole towards the medial cortex of the tibia which 

noted adequate and satisfactory syndesmotic hardware trajectory.  Then, the 

talons were activated proximally to purchase the inner fibular cortex.  At the 

"static position", one 4.0 millimeter screws was placed, via standard AO 

technique, to the distal fibula lock to secure the distal portion of the 

fracture.  Adequate and satisfactory screw position and length were noted on the

intraoperative x-ray. 





at this time, the medial gutter was slightly widened but reducible with manual 

syndesmotic joint stabilization. then two 3.5mm fully threaded cortical screws 

were placed through the syndesmotic holes in the fibulock traversing 

quadracortices to the medial malleolus with ankle dorsiflexed to neural. then 

mortise was noted restored with a normal dime sign laterally. dynamic stress 

test was stable for syndesmotic joint. There was a small Volkman fx, extra-

articular< 25% was stable. Final surveillance x-ray remarked adequate hardware 

position, alignment.  Restored ankle mortise alignment.  The sinus was irrigated

with copious saline solution.  The wound bed was also treated with vancomycin 

powder, less than 0.25 g.  The sites were closed in layers with 3-0 Vicryl, 4 

Monocryl and staples.  The surgical sites and serous blister sites were dressed 

with Xeroform.  The left lower extremity was well-padded with 4 x 4 gauze, ABD, 

soft rolls.  Left lower extremity was immobilized in a well-padded modified 

Dash compression dressing with ankle held in near 90 degrees.  Tourniquet was 

deflated and adequate digital perfusion was noted.





Patient tolerated the procedure and anesthesia well.  He was transferred to PACU

for continuous recovery with vital signs stable and neurovascular status intact.

 He will receive 24-hour IV antibiotics in the setting of severe compromised 

skin envelope.  Pending 3 view of the left ankle x-ray in PACU.  Patient is 

working with PT for 2 days as an inpatient and possible SNF placement 

afterwards.











RAMAN MILES DPM                Nov 2, 2021 16:35

## 2021-11-03 VITALS — SYSTOLIC BLOOD PRESSURE: 126 MMHG | DIASTOLIC BLOOD PRESSURE: 71 MMHG

## 2021-11-03 VITALS — DIASTOLIC BLOOD PRESSURE: 68 MMHG | SYSTOLIC BLOOD PRESSURE: 117 MMHG

## 2021-11-03 VITALS — DIASTOLIC BLOOD PRESSURE: 62 MMHG | SYSTOLIC BLOOD PRESSURE: 99 MMHG

## 2021-11-03 VITALS — SYSTOLIC BLOOD PRESSURE: 103 MMHG | DIASTOLIC BLOOD PRESSURE: 62 MMHG

## 2021-11-03 RX ADMIN — HYDROCODONE BITARTRATE AND ACETAMINOPHEN PRN TAB: 5; 325 TABLET ORAL at 03:17

## 2021-11-03 RX ADMIN — CHOLECALCIFEROL CAP 125 MCG (5000 UNIT) SCH UNIT: 125 CAP at 09:07

## 2021-11-03 RX ADMIN — GABAPENTIN SCH MG: 100 CAPSULE ORAL at 21:10

## 2021-11-03 RX ADMIN — OXYCODONE HYDROCHLORIDE AND ACETAMINOPHEN PRN TAB: 5; 325 TABLET ORAL at 11:56

## 2021-11-03 RX ADMIN — OXYCODONE HYDROCHLORIDE AND ACETAMINOPHEN PRN TAB: 5; 325 TABLET ORAL at 09:07

## 2021-11-03 RX ADMIN — MULTIPLE VITAMINS W/ MINERALS TAB SCH TAB: TAB at 09:07

## 2021-11-03 RX ADMIN — SENNOSIDES AND DOCUSATE SODIUM SCH TAB: 8.6; 5 TABLET ORAL at 09:07

## 2021-11-03 RX ADMIN — GABAPENTIN SCH MG: 100 CAPSULE ORAL at 14:15

## 2021-11-03 RX ADMIN — CHOLECALCIFEROL CAP 125 MCG (5000 UNIT) SCH UNIT: 125 CAP at 21:11

## 2021-11-03 RX ADMIN — ACETAMINOPHEN SCH MG: 325 TABLET, FILM COATED ORAL at 14:23

## 2021-11-03 RX ADMIN — GABAPENTIN SCH MG: 100 CAPSULE ORAL at 09:07

## 2021-11-03 RX ADMIN — ACETAMINOPHEN SCH MG: 325 TABLET, FILM COATED ORAL at 09:00

## 2021-11-03 RX ADMIN — OXYCODONE HYDROCHLORIDE AND ACETAMINOPHEN PRN TAB: 5; 325 TABLET ORAL at 15:57

## 2021-11-03 RX ADMIN — HYDROCODONE BITARTRATE AND ACETAMINOPHEN PRN TAB: 5; 325 TABLET ORAL at 23:22

## 2021-11-03 RX ADMIN — ACETAMINOPHEN SCH MG: 325 TABLET, FILM COATED ORAL at 21:10

## 2021-11-03 RX ADMIN — HYDROCODONE BITARTRATE AND ACETAMINOPHEN PRN TAB: 5; 325 TABLET ORAL at 18:37

## 2021-11-03 RX ADMIN — OXYCODONE HYDROCHLORIDE AND ACETAMINOPHEN PRN TAB: 5; 325 TABLET ORAL at 06:10

## 2021-11-03 NOTE — RAD
EXAM:  XR SACRUM AND COCCYX 2+VIEWS 11/3/2021 1:30 PM



CLINICAL INDICATION:  Fell on back



COMPARISON:  CT lumbar spine 10/27/2021



TECHNIQUE:  3 views of the sacrum and coccyx



FINDINGS:  There is no displaced fracture. Pubic symphysis and sacroiliac joints are normal. There ar
e bilateral total hip prostheses. This bulky bridging heterotopic ossification at the superolateral a
spect of the left hip. Advanced lower lumbar degenerative disc disease.



IMPRESSION:  No displaced fracture.



Electronically signed by: Mercedes Kelsey MD (11/3/2021 3:16 PM) [id number]

## 2021-11-03 NOTE — PDOC
[facility] PROGRESS NOTE


Date of Service


DOS:


DATE: 11/3/21 


TIME: 08:52





Chief Complaint


Chief Complaint


Postop day one ORIF left ankle fracture


Gastric bypass in 2004, bilateral hip placements in 2005.  Patient is Covid 

vaccinated





History of Present Illness


History of Present Illness


11/3/2021


Patient seen and examined


Discussed with RN


Chart reviewed


His left leg is in a cast


He had some coccygeal pain going for an x-ray today





Vitals/I&O


Vitals/I&O:





                                   Vital Signs








  Date Time  Temp Pulse Resp B/P (MAP) Pulse Ox O2 Delivery O2 Flow Rate FiO2


 


11/3/21 06:40      Room Air  


 


11/3/21 06:10   18  95   


 


11/3/21 06:06 98.2 77  117/68 (84)    





 98.2       


 


11/2/21 19:00       10.0 














                                    I & O   


 


 11/2/21 11/2/21 11/3/21





 15:00 23:00 07:00


 


Intake Total  1720 ml 400 ml


 


Output Total  1150 ml 1000 ml


 


Balance  570 ml -600 ml











Physical Exam


General:  No acute distress


Heart:  Regular rate


Lungs:  Clear


Abdomen:  Normal bowel sounds


Extremities:  Other (Left leg in cast)


Skin:  No rashes





Assessment and Plan


Assessmemt and Plan


Postop day one ORIF left ankle fracture


Gastric bypass in 2004, bilateral hip placements in 2005.  Patient is Covid 

vaccinated





Plan


Wound care


Home meds


DVT prophylaxis


Encourage p.o. intake


Trend labs


Full code


PT OT


Going for an x-ray today of the coccyx due to pain after fall


Appreciate subspecialist input


Might need skilled nursing





Comment


Review of Relevant


I have reviewed the following items alfonzo (where applicable) has been applied.


Medications:





Current Medications








 Medications


  (Trade)  Dose


 Ordered  Sig/Mitchell


 Route


 PRN Reason  Start Time


 Stop Time Status Last Admin


Dose Admin


 


 Morphine Sulfate


  (Morphine


 Sulfate)  1 mg  PRN Q10MIN  PRN


 IVP


 SEVERE PAIN 7-10  11/2/21 10:45


 11/2/21 19:00 DC 11/2/21 16:47





 


 Ringer's Solution  1,000 ml @ 


 30 mls/hr  Q24H


 IV


   11/2/21 11:00


 11/2/21 16:32 DC 11/2/21 13:07





 


 Acetaminophen/


 Hydrocodone Bitart


  (Lortab 5/325)  1 tab  PRN Q4HRS  PRN


 PO


 MILD PAIN 1-3  11/2/21 13:15


    11/3/21 03:17





 


 Vancomycin HCl


  (Vancomycin)  1 gm  STK-MED ONCE


 .ROUTE


   11/2/21 14:18


 11/2/21 14:19 DC 11/2/21 14:39





 


 Bupivacaine HCl


  (Sensorcaine Mpf


 0.25%)  30 ml  STK-MED ONCE


 .ROUTE


   11/2/21 15:45


 11/2/21 15:46 DC 11/2/21 14:29





 


 Gabapentin


  (Neurontin)  100 mg  TID


 PO


   11/2/21 21:00


    11/2/21 21:36





 


 Acetaminophen


  (Tylenol)  650 mg  TID


 PO


   11/2/21 21:00


    11/2/21 21:36





 


 Oxycodone/


 Acetaminophen


  (Percocet 5/325)  1 tab  PRN TID  PRN


 PO


 SEVERE PAIN 7-10  11/2/21 16:30


    11/3/21 06:10





 


 Vitamin D


  (Vitamin D3)  5,000 unit  BID


 PO


   11/2/21 21:00


    11/2/21 21:36





 


 Cefazolin Sodium/


 Dextrose  50 ml @ 


 100 mls/hr  TID


 IV


   11/2/21 21:00


 11/3/21 14:29  11/2/21 21:36





 


 Fentanyl Citrate


  (Fentanyl 2ml


 Vial)  25 mcg  PRN Q5MIN  PRN


 IVP


 MILD PAIN 1-3  11/2/21 17:45


 11/3/21 17:44  11/2/21 17:58





 


 Fentanyl Citrate


  (Fentanyl 2ml


 Vial)  50 mcg  PRN Q5MIN  PRN


 IVP


 MODERATE PAIN 4-6  11/2/21 17:45


 11/3/21 17:44  11/2/21 18:29














Justifications for Admission


Other Justification


Left ankle fracture











SHIMON DEJESUS III DO            Nov 3, 2021 08:54

## 2021-11-03 NOTE — PDOC
PROGRESS NOTES


Date of Service


DATE: 11/3/21 


TIME: 14:33





Subjective


Subjective


S/p November 2, 2021 left ankle open reduction and internal fixation with 

syndesmotic joint stabilization





At bedside, patient relates upwards to 6 out of 10 sharp pain to the lateral 

ankle.  Patient denies any pain to the medial aspect of the ankle.  Patient 

denies any constant duration of symptoms, persistent numbness or tingling 

sensation.  Patient is currently on IV Ancef 2 g 3 times daily.  Patient has 

been working well with PT on a walker while remain nonweightbearing to the surg

ical foot.





Objective


Objective





Vital Signs








  Date Time  Temp Pulse Resp B/P (MAP) Pulse Ox O2 Delivery O2 Flow Rate FiO2


 


11/3/21 12:30   20   Room Air  


 


11/3/21 11:56 97.5 57  99/62 (74)    





 97.5       


 


11/3/21 06:10     95   


 


11/2/21 19:00       10.0 














Intake and Output 


 


 11/3/21





 07:00


 


Intake Total 2120 ml


 


Output Total 2150 ml


 


Balance -30 ml


 


 


 


Intake Oral 820 ml


 


IV Total 1300 ml


 


Output Urine Total 2100 ml


 


Estimated Blood Loss 50 ml











Physical Exam


Physical Exam


General: AOx3 without distress


Dermatology:


-Postoperative splint and dressing in place without any strikethrough


-Exposed to digits 1 through 5 are pink and blanchable


Vascular:


-Digits are warm to touch


-CFT less than 3 seconds x 5


Neurology:


-Light touch sensation intact 1 through 5 digits


Musculoskeletal:


-Able to move digits


-Calf is soft and nontender





Diagnosis


DIAGNOSIS


S/p November 2, 2021 left ankle open reduction and internal fixation, 

syndesmotic joint stabilization





Plan


Plan of Care


-Explained clinical findings.  The ankle was reduced with restored ankle 

mortise.  I explained to patient that the challenges with healing is soft tissue

given the amount of edema, fracture blisters.  Patient verbalized understanding


-Complete 24 hours of IV Ancef 2 g 3 times daily


-Monitor for signs of infection


-Incentive spirometry compliance


-Pain management per protocol


-Nonweightbearing to the surgical foot, PT eval and treat for balance, pivot, 

turn, short distance ambulation while keeping the weight off the surgical foot 

with appropriate gait aid


-Patient may benefit from a SNF placement for elevation, remain nonweightbearing

to the surgical foot.  Especially given his prior multiple mechanical falls.


-Vitamin D 5000 international units, twice daily


-Multivitamins daily


-Elevate surgical foot with toes above the nose 45 minutes/h, ice behind the 

knee 15 minutes/h as needed


-Pending  consult for placement


-Pending coccyx x-ray





Dispo:


I will see patient 1 more time before discharge and plan to discharge with 

50,000 international units of vitamin D 3 weekly, zinc sulfate 50 mg daily, 

magnesium citrate 250 mg twice daily, protein 50 g/day, 2 multivitamin tablets





Comment


Review of Relevant


I have reviewed the following items alfonzo (where applicable) has been applied.


Medications





Current Medications


Fentanyl Citrate (Fentanyl 2ml Vial) 25 mcg PRN Q5MIN  PRN IVP MILD PAIN 1-3;  

Start 11/2/21 at 10:45;  Stop 11/2/21 at 16:31;  Status DC


Fentanyl Citrate (Fentanyl 2ml Vial) 50 mcg PRN Q5MIN  PRN IVP MODERATE PAIN 4-

6;  Start 11/2/21 at 10:45;  Stop 11/2/21 at 16:31;  Status DC


Morphine Sulfate (Morphine Sulfate) 1 mg PRN Q10MIN  PRN IVP SEVERE PAIN 7-10 

Last administered on 11/2/21at 16:47;  Start 11/2/21 at 10:45;  Stop 11/2/21 at 

19:00;  Status DC


Ringer's Solution 1,000 ml @  30 mls/hr Q24H IV  Last administered on 11/2/21at 

13:07;  Start 11/2/21 at 11:00;  Stop 11/2/21 at 16:32;  Status DC


Hydromorphone HCl (Dilaudid) 0.5 mg PRN Q10MIN  PRN IVP SEVERE PAIN 7-10, 2nd 

CHOICE;  Start 11/2/21 at 10:45;  Stop 11/2/21 at 19:00;  Status DC


Prochlorperazine Edisylate (Compazine) 5 mg PACU PRN  PRN IVP NAUSEA, MRX1;  

Start 11/2/21 at 10:45;  Stop 11/2/21 at 19:00;  Status DC


Cefazolin Sodium/ Dextrose 50 ml @  100 mls/hr 1X PREOP  PRN IV PRIOR TO 

PROCEDURE;  Start 11/2/21 at 13:00;  Stop 11/2/21 at 16:31;  Status DC


Sennosides (Senna) 17.2 mg PRN BID  PRN PO CONSTIPATION;  Start 11/2/21 at 13:15


Docusate Sodium (Colace) 100 mg PRN DAILY  PRN PO HARD STOOLS;  Start 11/2/21 at

13:15


Ondansetron HCl (Zofran) 4 mg PRN Q6HRS  PRN IVP NAUSEA/VOMITING;  Start 11/2/21

at 13:15


Dextrose (Dextrose 50%-Water Syringe) 12.5 gm PRN Q15MIN  PRN IV SEE COMMENTS;  

Start 11/2/21 at 13:15


Acetaminophen (Tylenol) 650 mg PRN Q4HRS  PRN PO TEMP OVER 100.4F OR MILD PAIN; 

Start 11/2/21 at 13:15;  Stop 11/2/21 at 16:31;  Status DC


Lorazepam (Ativan) 0.5 mg PRN Q6HRS  PRN PO ANXIETY / AGITATION;  Start 11/2/21 

at 13:15


Lorazepam (Ativan Inj) 0.25 mg PRN Q4HRS  PRN IV ANXIETY / AGITATION;  Start 

11/2/21 at 13:15


Acetaminophen/ Hydrocodone Bitart (Lortab 5/325) 1 tab PRN Q4HRS  PRN PO MILD-

MODERATE PAIN Last administered on 11/3/21at 03:17;  Start 11/2/21 at 13:15


Acetaminophen/ Hydrocodone Bitart (Lortab 5/325) 2 tab PRN Q4HRS  PRN PO 

MODERATE PAIN, SEVERE PAIN;  Start 11/2/21 at 13:15;  Stop 11/2/21 at 16:31;  

Status DC


Morphine Sulfate (Morphine Sulfate) 1 mg PRN Q1HR  PRN IV PAIN;  Start 11/2/21 

at 13:15;  Stop 11/2/21 at 16:31;  Status DC


Morphine Sulfate (Morphine Sulfate) 2 mg PRN Q2HR  PRN IVP SEVERE PAIN 7-10;  

Start 11/2/21 at 13:15;  Stop 11/2/21 at 16:32;  Status DC


Prochlorperazine Edisylate (Compazine) 10 mg PRN Q6HRS  PRN IV NAUSEA/VOMITING; 

Start 11/2/21 at 13:15;  Stop 11/2/21 at 16:32;  Status DC


Zolpidem Tartrate (Ambien) 2.5 mg PRN QHS  PRN PO INSOMNIA;  Start 11/2/21 at 

13:15


Propofol (Diprivan) 200 mg STK-MED ONCE IV ;  Start 11/2/21 at 13:36;  Stop 

11/2/21 at 13:36;  Status DC


Lidocaine HCl (Lidocaine Pf 2% Vial) 5 ml STK-MED ONCE .ROUTE ;  Start 11/2/21 

at 13:37;  Stop 11/2/21 at 13:37;  Status DC


Rocuronium Bromide (Zemuron) 50 mg STK-MED ONCE .ROUTE ;  Start 11/2/21 at 

13:38;  Stop 11/2/21 at 13:38;  Status DC


Dexamethasone Sodium Phosphate (Decadron) 4 mg STK-MED ONCE .ROUTE ;  Start 

11/2/21 at 13:39;  Stop 11/2/21 at 13:40;  Status DC


Fentanyl Citrate (Fentanyl 5ml Vial) 250 mcg STK-MED ONCE .ROUTE ;  Start 

11/2/21 at 13:47;  Stop 11/2/21 at 13:47;  Status DC


Vancomycin HCl (Vancomycin) 1 gm STK-MED ONCE .ROUTE  Last administered on 

11/2/21at 14:39;  Start 11/2/21 at 14:18;  Stop 11/2/21 at 14:19;  Status DC


Propofol (Diprivan) 200 mg STK-MED ONCE IV ;  Start 11/2/21 at 15:12;  Stop 

11/2/21 at 15:12;  Status DC


Propofol (Diprivan) 200 mg STK-MED ONCE IV ;  Start 11/2/21 at 15:12;  Stop 

11/2/21 at 15:12;  Status DC


Sevoflurane (Ultane) 90 ml STK-MED ONCE IH ;  Start 11/2/21 at 15:12;  Stop 

11/2/21 at 15:12;  Status DC


Cefazolin Sodium (Ancef) 1 gm STK-MED ONCE IVP ;  Start 11/2/21 at 15:36;  Stop 

11/2/21 at 15:37;  Status DC


Cefazolin Sodium (Ancef) 1 gm STK-MED ONCE IVP ;  Start 11/2/21 at 15:37;  Stop 

11/2/21 at 15:37;  Status DC


Neostigmine Bromide (Neostigmine Methylsulfate) 5 mg STK-MED ONCE .ROUTE ;  

Start 11/2/21 at 15:39;  Stop 11/2/21 at 15:40;  Status DC


Glycopyrrolate (Robinul) 1 mg STK-MED ONCE .ROUTE ;  Start 11/2/21 at 15:40;  

Stop 11/2/21 at 15:40;  Status DC


Glycopyrrolate (Robinul) 1 mg STK-MED ONCE .ROUTE ;  Start 11/2/21 at 15:40;  

Stop 11/2/21 at 15:41;  Status DC


Bupivacaine HCl (Sensorcaine Mpf 0.25%) 30 ml STK-MED ONCE .ROUTE  Last 

administered on 11/2/21at 14:29;  Start 11/2/21 at 15:45;  Stop 11/2/21 at 

15:46;  Status DC


Multivitamins (Thera M Plus) 1 tab DAILY PO  Last administered on 11/3/21at 

09:07;  Start 11/3/21 at 09:00


Senna/Docusate Sodium (Senna Plus) 1 tab DAILY PO  Last administered on 

11/3/21at 09:07;  Start 11/3/21 at 09:00


Polyethylene Glycol (miraLAX PACKET) 17 gm PRN DAILY  PRN PO CONSTIPATION;  

Start 11/2/21 at 16:30


Ondansetron HCl (Zofran) 4 mg PRN Q4HRS  PRN IVP NAUSEA/VOMITING;  Start 11/2/21

at 16:30;  Stop 11/2/21 at 16:32;  Status DC


Dextrose (Dextrose 50%-Water Syringe) 12.5 gm PRN Q15MIN  PRN IV SEE COMMENTS;  

Start 11/2/21 at 16:30;  Stop 11/2/21 at 16:31;  Status DC


Gabapentin (Neurontin) 100 mg TID PO  Last administered on 11/3/21at 14:15;  

Start 11/2/21 at 21:00


Acetaminophen (Tylenol) 650 mg TID PO  Last administered on 11/3/21at 14:23;  

Start 11/2/21 at 21:00


Oxycodone/ Acetaminophen (Percocet 5/325) 1 tab PRN TID  PRN PO SEVERE PAIN 7-10

Last administered on 11/3/21at 11:56;  Start 11/2/21 at 16:30


Vitamin D (Vitamin D3) 5,000 unit BID PO  Last administered on 11/3/21at 09:07; 

Start 11/2/21 at 21:00


Cefazolin Sodium/ Dextrose 50 ml @  100 mls/hr TID IV  Last administered on 

11/3/21at 14:18;  Start 11/2/21 at 21:00;  Stop 11/3/21 at 14:29;  Status DC


Morphine Sulfate (Morphine Sulfate) 2 mg STK-MED ONCE .ROUTE ;  Start 11/2/21 at

16:40;  Stop 11/2/21 at 16:40;  Status DC


Ropivacaine (Naropin 0.5%) 20 ml STK-MED ONCE .ROUTE ;  Start 11/2/21 at 16:52; 

Stop 11/2/21 at 16:53;  Status DC


Fentanyl Citrate (Fentanyl 2ml Vial) 25 mcg PRN Q5MIN  PRN IVP MILD PAIN 1-3 

Last administered on 11/2/21at 17:58;  Start 11/2/21 at 17:45;  Stop 11/3/21 at 

17:44


Fentanyl Citrate (Fentanyl 2ml Vial) 50 mcg PRN Q5MIN  PRN IVP MODERATE PAIN 4-6

Last administered on 11/2/21at 18:29;  Start 11/2/21 at 17:45;  Stop 11/3/21 at 

17:44


Morphine Sulfate (Morphine Sulfate) 1 mg PRN Q10MIN  PRN IVP SEVERE PAIN 7-10;  

Start 11/2/21 at 17:45;  Stop 11/3/21 at 17:44


Hydromorphone HCl (Dilaudid) 0.5 mg PRN Q10MIN  PRN IVP SEVERE PAIN 7-10, 2nd 

CHOICE;  Start 11/2/21 at 17:45;  Stop 11/3/21 at 17:44


Prochlorperazine Edisylate (Compazine) 5 mg PACU PRN  PRN IVP NAUSEA, MRX1;  

Start 11/2/21 at 17:45;  Stop 11/3/21 at 17:44





Active Scripts


Active


Hydrocodone-Apap 5-325  ** (Hydrocodone Bit/Acetaminophen) 1 Tab Tablet 1 Tab PO

PRN Q6HRS PRN 4 Days


Reported


Cephalexin 500 Mg Tablet 500 Mg PO BID


Tylenol (Acetaminophen) 325 Mg Tablet 325 Mg PO PRN PRN


Vitals/I & O





Vital Sign - Last 24 Hours








 11/2/21 11/2/21 11/2/21 11/2/21





 16:04 16:04 16:19 16:34


 


Pulse 59  56 68


 


Resp 20  20 20


 


B/P (MAP) 118/65  118/65 125/55


 


Pulse Ox 100  100 95


 


O2 Delivery Simple Mask Mask Simple Mask Room Air


 


O2 Flow Rate 10 10 10 





 11/2/21 11/2/21 11/2/21 11/2/21





 16:47 16:49 16:57 17:04


 


Pulse  60  64


 


Resp 20 20 20 20


 


B/P (MAP)  125/55  110/63


 


Pulse Ox 99 96 95 97


 


O2 Delivery Room Air Room Air Room Air Room Air





 11/2/21 11/2/21 11/2/21 11/2/21





 17:19 17:34 17:43 17:45


 


Temp    99.4





    99.4


 


Pulse 77 69  85


 


Resp 20 20 20 20


 


B/P (MAP) 119/73 119/57  122/74 (90)


 


Pulse Ox 94 94 94 96


 


O2 Delivery Room Air Room Air Room Air Room Air


 


    





    





 11/2/21 11/2/21 11/2/21 11/2/21





 17:49 17:58 18:04 18:04


 


Pulse 73   87


 


Resp 20 20  20


 


B/P (MAP) 139/69   120/60


 


Pulse Ox 96 97  98


 


O2 Delivery Room Air Room Air Mask Room Air


 


O2 Flow Rate   10 





 11/2/21 11/2/21 11/2/21 11/2/21





 18:29 19:00 19:15 19:40


 


Temp   99.0 





   99.0 


 


Pulse   90 


 


Resp 20  18 


 


B/P (MAP)   112/74 (87) 


 


Pulse Ox 97 94 97 


 


O2 Delivery Room Air  Room Air Mask


 


O2 Flow Rate  10.0  


 


    





    





 11/2/21 11/2/21 11/2/21 11/2/21





 19:45 20:15 21:15 22:15


 


Temp  99.0 99.2 





  99.0 99.2 


 


Pulse 87 94 87 86


 


Resp 18 18 18 18


 


B/P (MAP) 119/71 (87) 123/60 (81) 109/66 (80) 100/63 (75)


 


Pulse Ox 97 94 94 94


 


O2 Delivery Room Air Room Air Room Air Room Air


 


    





    





 11/2/21 11/3/21 11/3/21 11/3/21





 23:25 03:17 03:18 03:47


 


Temp 98.5  99.6 





 98.5  99.6 


 


Pulse 87  84 


 


Resp 16 18 18 


 


B/P (MAP) 97/61 (73)  126/71 (89) 


 


Pulse Ox 94 97 97 


 


O2 Delivery Room Air  Room Air Room Air


 


    





    





 11/3/21 11/3/21 11/3/21 11/3/21





 06:06 06:10 06:40 08:00


 


Temp 98.2   





 98.2   


 


Pulse 77   


 


Resp 16 18  


 


B/P (MAP) 117/68 (84)   


 


Pulse Ox 95 95  


 


O2 Delivery Room Air Room Air Room Air Room Air


 


    





    





 11/3/21 11/3/21 11/3/21 





 11:56 11:56 12:30 


 


Temp  97.5  





  97.5  


 


Pulse  57  


 


Resp 20 20 20 


 


B/P (MAP)  99/62 (74)  


 


O2 Delivery Room Air Room Air Room Air 














Intake and Output   


 


 11/2/21 11/2/21 11/3/21





 15:00 23:00 07:00


 


Intake Total  1720 ml 400 ml


 


Output Total  1150 ml 1000 ml


 


Balance  570 ml -600 ml











Justifications for Admission


Other Justification


Left ankle fracture











RAMAN MILES DPM                Nov 3, 2021 14:54

## 2021-11-04 VITALS — DIASTOLIC BLOOD PRESSURE: 84 MMHG | SYSTOLIC BLOOD PRESSURE: 125 MMHG

## 2021-11-04 VITALS — DIASTOLIC BLOOD PRESSURE: 66 MMHG | SYSTOLIC BLOOD PRESSURE: 118 MMHG

## 2021-11-04 VITALS — SYSTOLIC BLOOD PRESSURE: 91 MMHG | DIASTOLIC BLOOD PRESSURE: 73 MMHG

## 2021-11-04 VITALS — DIASTOLIC BLOOD PRESSURE: 62 MMHG | SYSTOLIC BLOOD PRESSURE: 111 MMHG

## 2021-11-04 VITALS — DIASTOLIC BLOOD PRESSURE: 79 MMHG | SYSTOLIC BLOOD PRESSURE: 122 MMHG

## 2021-11-04 RX ADMIN — GABAPENTIN SCH MG: 100 CAPSULE ORAL at 13:41

## 2021-11-04 RX ADMIN — ACETAMINOPHEN SCH MG: 325 TABLET, FILM COATED ORAL at 09:00

## 2021-11-04 RX ADMIN — GABAPENTIN SCH MG: 100 CAPSULE ORAL at 20:41

## 2021-11-04 RX ADMIN — CHOLECALCIFEROL CAP 125 MCG (5000 UNIT) SCH UNIT: 125 CAP at 08:42

## 2021-11-04 RX ADMIN — MULTIPLE VITAMINS W/ MINERALS TAB SCH TAB: TAB at 08:41

## 2021-11-04 RX ADMIN — ACETAMINOPHEN SCH MG: 325 TABLET, FILM COATED ORAL at 20:42

## 2021-11-04 RX ADMIN — SENNOSIDES AND DOCUSATE SODIUM SCH TAB: 8.6; 5 TABLET ORAL at 08:41

## 2021-11-04 RX ADMIN — HYDROCODONE BITARTRATE AND ACETAMINOPHEN PRN TAB: 5; 325 TABLET ORAL at 08:46

## 2021-11-04 RX ADMIN — ACETAMINOPHEN SCH MG: 325 TABLET, FILM COATED ORAL at 14:00

## 2021-11-04 RX ADMIN — HYDROCODONE BITARTRATE AND ACETAMINOPHEN PRN TAB: 5; 325 TABLET ORAL at 13:42

## 2021-11-04 RX ADMIN — HYDROCODONE BITARTRATE AND ACETAMINOPHEN PRN TAB: 5; 325 TABLET ORAL at 20:42

## 2021-11-04 RX ADMIN — OXYCODONE HYDROCHLORIDE AND ACETAMINOPHEN PRN TAB: 5; 325 TABLET ORAL at 16:46

## 2021-11-04 RX ADMIN — GABAPENTIN SCH MG: 100 CAPSULE ORAL at 08:41

## 2021-11-04 RX ADMIN — CHOLECALCIFEROL CAP 125 MCG (5000 UNIT) SCH UNIT: 125 CAP at 20:41

## 2021-11-04 NOTE — PDOC
[facility] PROGRESS NOTE


Date of Service


DOS:


DATE: 11/4/21 


TIME: 11:31





Chief Complaint


Chief Complaint


Postop day 2 ORIF left ankle fracture


Gastric bypass in 2004, bilateral hip placements in 2005.  Patient is Covid 

vaccinated





History of Present Illness


History of Present Illness


11/4


Patient evaluated examined at bedside.  Doing well says pain is well controlled.

 Definitely needs rehab placement.  Plan for discharge to [facility] 

tomorrow.





11/3/2021


Patient seen and examined


Discussed with RN


Chart reviewed


His left leg is in a cast


He had some coccygeal pain going for an x-ray today





Vitals/I&O


Vitals/I&O:





                                   Vital Signs








  Date Time  Temp Pulse Resp B/P (MAP) Pulse Ox O2 Delivery O2 Flow Rate FiO2


 


11/4/21 11:02 98.2 66 20 122/79 (93) 97 Room Air  





 98.2       


 


11/4/21 08:00       10.0 














                                    I & O   


 


 11/3/21 11/3/21 11/4/21





 15:00 23:00 07:00


 


Intake Total  500 ml 300 ml


 


Output Total 450 ml  550 ml


 


Balance -450 ml 500 ml -250 ml











Physical Exam


General:  No acute distress


Heart:  Regular rate


Lungs:  Clear


Abdomen:  Normal bowel sounds


Extremities:  Other (Left leg in cast)


Skin:  No rashes





Assessment and Plan


Assessmemt and Plan


Postop day 2 ORIF left ankle fracture


Gastric bypass in 2004, bilateral hip placements in 2005.  Patient is Covid 

vaccinated





Plan


Wound care


Home meds


DVT prophylaxis


Encourage p.o. intake


Trend labs


Full code


PT OT


Going for an x-ray today of the coccyx due to pain after fall


Appreciate subspecialist input


Might need skilled nursing





Comment


Review of Relevant


I have reviewed the following items alfonzo (where applicable) has been applied.





Justifications for Admission


Other Justification


Left ankle fracture











HERNÁN HORNE MD          Nov 4, 2021 11:32

## 2021-11-04 NOTE — PDOC
PROGRESS NOTES


Date of Service


DATE: 11/4/21 


TIME: 08:35





Subjective


Subjective


S/p November 2, 2021 left ankle open reduction and internal fixation with 

syndesmotic joint stabilization





Patient was seen resting comfortably in bed.  Patient relates upwards to 5 out 

of 10 sharp pain to the lateral ankle, currently well managed with current pain 

regimen.  Patient denies any numbness or tingling sensation to left lower 

extremity or any constitutional symptoms.  He is working well with PT.





Objective


Objective





Vital Signs








  Date Time  Temp Pulse Resp B/P (MAP) Pulse Ox O2 Delivery O2 Flow Rate FiO2


 


11/4/21 06:00 98.8 70 20 118/66 (83) 97 Room Air  





 98.8       


 


11/2/21 19:00       10.0 














Intake and Output 


 


 11/4/21





 07:00


 


Intake Total 800 ml


 


Output Total 1000 ml


 


Balance -200 ml


 


 


 


Intake Oral 800 ml


 


Output Urine Total 1000 ml


 


# Voids 2


 


# Bowel Movements 1











Physical Exam


Physical Exam


General: AOx3 without distress


Dermatology:


-Postoperative splint and dressing in place without any strikethrough


-Exposed to digits 1 through 5 are pink and blanchable


Vascular:


-Digits are warm to touch


-CFT less than 3 seconds x 5


Neurology:


-Light touch sensation intact 1 through 5 digits


Musculoskeletal:


-Able to move digits


-Calf is soft and nontender





Plan


Plan of Care


-Complete 24 hours of IV Ancef 2 g 3 times daily


-Monitor for signs of infection


-Incentive spirometry compliance


-Pain management per protocol


-Nonweightbearing to the surgical foot, PT eval and treat for balance, pivot, 

turn, short distance ambulation while keeping the weight off the surgical foot 

with appropriate gait aid


-Patient may benefit from a SNF placement for elevation, remain nonweightbearing

to the surgical foot.  Especially given his prior multiple mechanical falls.


-Vitamin D 5000 international units, twice daily


-Multivitamins daily


-Elevate surgical foot with toes above the nose 45 minutes/h, ice behind the 

knee 15 minutes/h as needed


-Pending  consult for placement


-Coccyx x-ray: neg for fx





Dispo:


Please discharge with 50,000 international units of vitamin D 3 weekly, zinc 

sulfate 50 mg daily, magnesium citrate 250 mg twice daily, protein 50 g/day, 2 

multivitamin tablets


Pending placement


DVT ppx: outpatient with ASA 81mg daily, sustained hydration and upper body 

mobility


Pain management: consider Norco [5mg], q6h prn, gabapentin 100mg, q6h x 10 days


Patient to return to outpatient clinic for dressing change in 6 days.  Our 

office will call patient for time and date arrangement


From my perspective, patient is safe to be discharged to SNF





Comment


Review of Relevant


I have reviewed the following items alfonzo (where applicable) has been applied.


Medications





Current Medications


Fentanyl Citrate (Fentanyl 2ml Vial) 25 mcg PRN Q5MIN  PRN IVP MILD PAIN 1-3;  

Start 11/2/21 at 10:45;  Stop 11/2/21 at 16:31;  Status DC


Fentanyl Citrate (Fentanyl 2ml Vial) 50 mcg PRN Q5MIN  PRN IVP MODERATE PAIN 4-

6;  Start 11/2/21 at 10:45;  Stop 11/2/21 at 16:31;  Status DC


Morphine Sulfate (Morphine Sulfate) 1 mg PRN Q10MIN  PRN IVP SEVERE PAIN 7-10 

Last administered on 11/2/21at 16:47;  Start 11/2/21 at 10:45;  Stop 11/2/21 at 

19:00;  Status DC


Ringer's Solution 1,000 ml @  30 mls/hr Q24H IV  Last administered on 11/2/21at 

13:07;  Start 11/2/21 at 11:00;  Stop 11/2/21 at 16:32;  Status DC


Hydromorphone HCl (Dilaudid) 0.5 mg PRN Q10MIN  PRN IVP SEVERE PAIN 7-10, 2nd 

CHOICE;  Start 11/2/21 at 10:45;  Stop 11/2/21 at 19:00;  Status DC


Prochlorperazine Edisylate (Compazine) 5 mg PACU PRN  PRN IVP NAUSEA, MRX1;  

Start 11/2/21 at 10:45;  Stop 11/2/21 at 19:00;  Status DC


Cefazolin Sodium/ Dextrose 50 ml @  100 mls/hr 1X PREOP  PRN IV PRIOR TO 

PROCEDURE;  Start 11/2/21 at 13:00;  Stop 11/2/21 at 16:31;  Status DC


Sennosides (Senna) 17.2 mg PRN BID  PRN PO CONSTIPATION;  Start 11/2/21 at 13:15


Docusate Sodium (Colace) 100 mg PRN DAILY  PRN PO HARD STOOLS;  Start 11/2/21 at

 13:15


Ondansetron HCl (Zofran) 4 mg PRN Q6HRS  PRN IVP NAUSEA/VOMITING;  Start 11/2/21

 at 13:15


Dextrose (Dextrose 50%-Water Syringe) 12.5 gm PRN Q15MIN  PRN IV SEE COMMENTS;  

Start 11/2/21 at 13:15


Acetaminophen (Tylenol) 650 mg PRN Q4HRS  PRN PO TEMP OVER 100.4F OR MILD PAIN; 

 Start 11/2/21 at 13:15;  Stop 11/2/21 at 16:31;  Status DC


Lorazepam (Ativan) 0.5 mg PRN Q6HRS  PRN PO ANXIETY / AGITATION;  Start 11/2/21 

at 13:15


Lorazepam (Ativan Inj) 0.25 mg PRN Q4HRS  PRN IV ANXIETY / AGITATION;  Start 

11/2/21 at 13:15


Acetaminophen/ Hydrocodone Bitart (Lortab 5/325) 1 tab PRN Q4HRS  PRN PO MILD-

MODERATE PAIN Last administered on 11/3/21at 23:22;  Start 11/2/21 at 13:15


Acetaminophen/ Hydrocodone Bitart (Lortab 5/325) 2 tab PRN Q4HRS  PRN PO 

MODERATE PAIN, SEVERE PAIN;  Start 11/2/21 at 13:15;  Stop 11/2/21 at 16:31;  

Status DC


Morphine Sulfate (Morphine Sulfate) 1 mg PRN Q1HR  PRN IV PAIN;  Start 11/2/21 

at 13:15;  Stop 11/2/21 at 16:31;  Status DC


Morphine Sulfate (Morphine Sulfate) 2 mg PRN Q2HR  PRN IVP SEVERE PAIN 7-10;  

Start 11/2/21 at 13:15;  Stop 11/2/21 at 16:32;  Status DC


Prochlorperazine Edisylate (Compazine) 10 mg PRN Q6HRS  PRN IV NAUSEA/VOMITING; 

 Start 11/2/21 at 13:15;  Stop 11/2/21 at 16:32;  Status DC


Zolpidem Tartrate (Ambien) 2.5 mg PRN QHS  PRN PO INSOMNIA;  Start 11/2/21 at 

13:15


Propofol (Diprivan) 200 mg STK-MED ONCE IV ;  Start 11/2/21 at 13:36;  Stop 

11/2/21 at 13:36;  Status DC


Lidocaine HCl (Lidocaine Pf 2% Vial) 5 ml STK-MED ONCE .ROUTE ;  Start 11/2/21 

at 13:37;  Stop 11/2/21 at 13:37;  Status DC


Rocuronium Bromide (Zemuron) 50 mg STK-MED ONCE .ROUTE ;  Start 11/2/21 at 

13:38;  Stop 11/2/21 at 13:38;  Status DC


Dexamethasone Sodium Phosphate (Decadron) 4 mg STK-MED ONCE .ROUTE ;  Start 

11/2/21 at 13:39;  Stop 11/2/21 at 13:40;  Status DC


Fentanyl Citrate (Fentanyl 5ml Vial) 250 mcg STK-MED ONCE .ROUTE ;  Start 

11/2/21 at 13:47;  Stop 11/2/21 at 13:47;  Status DC


Vancomycin HCl (Vancomycin) 1 gm STK-MED ONCE .ROUTE  Last administered on 

11/2/21at 14:39;  Start 11/2/21 at 14:18;  Stop 11/2/21 at 14:19;  Status DC


Propofol (Diprivan) 200 mg STK-MED ONCE IV ;  Start 11/2/21 at 15:12;  Stop 

11/2/21 at 15:12;  Status DC


Propofol (Diprivan) 200 mg STK-MED ONCE IV ;  Start 11/2/21 at 15:12;  Stop 

11/2/21 at 15:12;  Status DC


Sevoflurane (Ultane) 90 ml STK-MED ONCE IH ;  Start 11/2/21 at 15:12;  Stop 

11/2/21 at 15:12;  Status DC


Cefazolin Sodium (Ancef) 1 gm STK-MED ONCE IVP ;  Start 11/2/21 at 15:36;  Stop 

11/2/21 at 15:37;  Status DC


Cefazolin Sodium (Ancef) 1 gm STK-MED ONCE IVP ;  Start 11/2/21 at 15:37;  Stop 

11/2/21 at 15:37;  Status DC


Neostigmine Bromide (Neostigmine Methylsulfate) 5 mg STK-MED ONCE .ROUTE ;  

Start 11/2/21 at 15:39;  Stop 11/2/21 at 15:40;  Status DC


Glycopyrrolate (Robinul) 1 mg STK-MED ONCE .ROUTE ;  Start 11/2/21 at 15:40;  

Stop 11/2/21 at 15:40;  Status DC


Glycopyrrolate (Robinul) 1 mg STK-MED ONCE .ROUTE ;  Start 11/2/21 at 15:40;  

Stop 11/2/21 at 15:41;  Status DC


Bupivacaine HCl (Sensorcaine Mpf 0.25%) 30 ml STK-MED ONCE .ROUTE  Last 

administered on 11/2/21at 14:29;  Start 11/2/21 at 15:45;  Stop 11/2/21 at 

15:46;  Status DC


Multivitamins (Thera M Plus) 1 tab DAILY PO  Last administered on 11/3/21at 09

:07;  Start 11/3/21 at 09:00


Senna/Docusate Sodium (Senna Plus) 1 tab DAILY PO  Last administered on 

11/3/21at 09:07;  Start 11/3/21 at 09:00


Polyethylene Glycol (miraLAX PACKET) 17 gm PRN DAILY  PRN PO CONSTIPATION;  

Start 11/2/21 at 16:30


Ondansetron HCl (Zofran) 4 mg PRN Q4HRS  PRN IVP NAUSEA/VOMITING;  Start 11/2/21

 at 16:30;  Stop 11/2/21 at 16:32;  Status DC


Dextrose (Dextrose 50%-Water Syringe) 12.5 gm PRN Q15MIN  PRN IV SEE COMMENTS;  

Start 11/2/21 at 16:30;  Stop 11/2/21 at 16:31;  Status DC


Gabapentin (Neurontin) 100 mg TID PO  Last administered on 11/3/21at 21:10;  

Start 11/2/21 at 21:00


Acetaminophen (Tylenol) 650 mg TID PO  Last administered on 11/3/21at 21:10;  

Start 11/2/21 at 21:00


Oxycodone/ Acetaminophen (Percocet 5/325) 1 tab PRN TID  PRN PO SEVERE PAIN 7-10

 Last administered on 11/3/21at 15:57;  Start 11/2/21 at 16:30


Vitamin D (Vitamin D3) 5,000 unit BID PO  Last administered on 11/3/21at 21:11; 

 Start 11/2/21 at 21:00


Cefazolin Sodium/ Dextrose 50 ml @  100 mls/hr TID IV  Last administered on 

11/3/21at 14:18;  Start 11/2/21 at 21:00;  Stop 11/3/21 at 14:29;  Status DC


Morphine Sulfate (Morphine Sulfate) 2 mg STK-MED ONCE .ROUTE ;  Start 11/2/21 at

 16:40;  Stop 11/2/21 at 16:40;  Status DC


Ropivacaine (Naropin 0.5%) 20 ml STK-MED ONCE .ROUTE ;  Start 11/2/21 at 16:52; 

 Stop 11/2/21 at 16:53;  Status DC


Fentanyl Citrate (Fentanyl 2ml Vial) 25 mcg PRN Q5MIN  PRN IVP MILD PAIN 1-3 

Last administered on 11/2/21at 17:58;  Start 11/2/21 at 17:45;  Stop 11/3/21 at 

17:44;  Status DC


Fentanyl Citrate (Fentanyl 2ml Vial) 50 mcg PRN Q5MIN  PRN IVP MODERATE PAIN 4-6

 Last administered on 11/2/21at 18:29;  Start 11/2/21 at 17:45;  Stop 11/3/21 at

 17:44;  Status DC


Morphine Sulfate (Morphine Sulfate) 1 mg PRN Q10MIN  PRN IVP SEVERE PAIN 7-10;  

Start 11/2/21 at 17:45;  Stop 11/3/21 at 17:44;  Status DC


Hydromorphone HCl (Dilaudid) 0.5 mg PRN Q10MIN  PRN IVP SEVERE PAIN 7-10, 2nd 

CHOICE;  Start 11/2/21 at 17:45;  Stop 11/3/21 at 17:44;  Status DC


Prochlorperazine Edisylate (Compazine) 5 mg PACU PRN  PRN IVP NAUSEA, MRX1;  

Start 11/2/21 at 17:45;  Stop 11/3/21 at 17:44;  Status DC





Active Scripts


Active


Hydrocodone-Apap 5-325  ** (Hydrocodone Bit/Acetaminophen) 1 Tab Tablet 1 Tab PO

 PRN Q6HRS PRN 4 Days


Reported


Cephalexin 500 Mg Tablet 500 Mg PO BID


Tylenol (Acetaminophen) 325 Mg Tablet 325 Mg PO PRN PRN


Vitals/I & O





Vital Sign - Last 24 Hours








 11/3/21 11/3/21 11/3/21 11/3/21





 11:56 11:56 12:30 15:40


 


Temp  97.5  





  97.5  


 


Pulse  57  


 


Resp 20 20 20 16


 


B/P (MAP)  99/62 (74)  


 


O2 Delivery Room Air Room Air Room Air Room Air


 


    





    





 11/3/21 11/3/21 11/3/21 11/3/21





 18:37 19:00 19:10 23:22


 


Temp  98.8  





  98.8  


 


Pulse  77  


 


Resp 20 20 20 20


 


B/P (MAP)  103/62 (76)  


 


Pulse Ox  96  


 


O2 Delivery  Room Air Room Air Room Air


 


    





    





 11/3/21 11/4/21  





 23:55 06:00  


 


Temp  98.8  





  98.8  


 


Pulse  70  


 


Resp 20 20  


 


B/P (MAP)  118/66 (83)  


 


Pulse Ox  97  


 


O2 Delivery Room Air Room Air  














Intake and Output   


 


 11/3/21 11/3/21 11/4/21





 15:00 23:00 07:00


 


Intake Total  500 ml 300 ml


 


Output Total 450 ml  550 ml


 


Balance -450 ml 500 ml -250 ml











Justifications for Admission


Other Justification


Left ankle fracture











RAMAN MILES DPM                Nov 4, 2021 08:38

## 2021-11-04 NOTE — NUR
CMS intact left toes.  No drainage seen to splint.  Left leg elevated on pillows  but 
refuses ice pack.

Asking if he we have beer for him.

## 2021-11-05 VITALS — DIASTOLIC BLOOD PRESSURE: 60 MMHG | SYSTOLIC BLOOD PRESSURE: 124 MMHG

## 2021-11-05 RX ADMIN — GABAPENTIN SCH MG: 100 CAPSULE ORAL at 08:41

## 2021-11-05 RX ADMIN — HYDROCODONE BITARTRATE AND ACETAMINOPHEN PRN TAB: 5; 325 TABLET ORAL at 12:05

## 2021-11-05 RX ADMIN — HYDROCODONE BITARTRATE AND ACETAMINOPHEN PRN TAB: 5; 325 TABLET ORAL at 08:42

## 2021-11-05 RX ADMIN — SENNOSIDES AND DOCUSATE SODIUM SCH TAB: 8.6; 5 TABLET ORAL at 08:42

## 2021-11-05 RX ADMIN — HYDROCODONE BITARTRATE AND ACETAMINOPHEN PRN TAB: 5; 325 TABLET ORAL at 01:25

## 2021-11-05 RX ADMIN — ACETAMINOPHEN SCH MG: 325 TABLET, FILM COATED ORAL at 08:41

## 2021-11-05 RX ADMIN — MULTIPLE VITAMINS W/ MINERALS TAB SCH TAB: TAB at 08:41

## 2021-11-05 RX ADMIN — CHOLECALCIFEROL CAP 125 MCG (5000 UNIT) SCH UNIT: 125 CAP at 08:41

## 2021-11-05 NOTE — SNU/HH DC
DISCHARGE ORDERS


DISCHARGE INFORMATION:


DISCHARGE DATE:  Nov 5, 2021


FINAL DIAGNOSIS


Ankle Fracture


CONDITION ON DISCHARGE:  Stable





CODE STATUS:


Code Status:  Full





SKILLED NURSING:


SNF STAY <30 DAYS:  Yes





POST DISCHARGE ORDERS:


ACTIVITY ORDERS:  Activity as tolerated


WEIGHT BEARING STATUS:  As tolerated


DIET AFTER DISCHARGE:  Regular


WOUND/INCISION CARE:  Ice to area for comfort, Keep wound/cast CDI





CHECKS AFTER DISCHARGE:


CHECKS AFTER DISCHARGE:  Check blood press - daily, Check your Temp as needed





TREATMENT/EQUIPMENT ORDERS:


ADAPTIVE EQUIPMENT NEEDED:  Crutches, Walker


Physical Therapy For:  Evalulation/Treatment


Occupational Therapy For:  Evaluation/Treatment





DISCHARGE MEDICATIONS:


Home Meds


Active Scripts


Multivitamin (One-Daily Multi-Vitamin) 1 Each Tablet, 2 TAB PO DAILY for 

nutrition for 30 Days, #60 TAB 0 Refills


   Prov:HERNÁN HORNE MD         11/5/21


Cholecalciferol (Vitamin D3) (Vitamin D3) 1,250 Mcg Capsule, 1250 MCG PO WEEKLY 

for deficiency for 60 Days, #60 CAP


   Prov:HERNÁN HORNE MD         11/5/21


Protein Supplement (Nutritional Drink Mix) 420 Gm Powder, 420 GM PO DAILY for 

malnutrition for 30 Days, #30 MISC


   Prov:HERNÁN HORNE MD         11/5/21


Magnesium Citrate (MAGNESIUM CITRATE) 100 Mg Tablet, 100 MG PO BID for 

constipation for 60 Days, #120 TAB


   Prov:HERNÁN HORNE MD         11/5/21


Zinc Sulfate (Zinc Sulfate) 50 Mg Tablet, 50 MG PO DAILY for wound healing for 

60 Days, #60 TAB


   Prov:HERNÁN HORNE MD         11/5/21


Oxycodone/Apap 5-325 (PERCOCET 5-325 MG TABLET **) 1 Each Tablet, 1 TAB PO PRN 

TID PRN for SEVERE PAIN 7-10 for 14 Days, #30 TAB


   Prov:HERNÁN HORNE MD         11/5/21


Reported Medications


Acetaminophen (TYLENOL) 325 Mg Tablet, 325 MG PO PRN PRN for PAIN, TAB


   11/2/21


Discontinued Reported Medications


Cephalexin (CEPHALEXIN) 500 Mg Tablet, 500 MG PO BID for ANTIBIOTIC , TAB


   11/2/21


Discontinued Scripts


Hydrocodone Bit/Acetaminophen (HYDROCODONE-APAP 5-325  **) 1 Tab Tablet, 1 TAB 

PO PRN Q6HRS PRN for PAIN for 4 Days, #16 TAB 0 Refills


   Prov:KIM RAMOS DO         10/27/21











HERNÁN HORNE MD          Nov 5, 2021 09:15

## 2021-11-05 NOTE — NUR
Patient left in a wheelchair to  at 1310. Wife at bedside. Discharge education gone over 
with the patient and the facility prior to dismissal. Splint with wrap to LLE is CDI with no 
drainge noted. Education performed by OT, PT, and this nurse about NWB status and 
restrictions- patient stated understanding. IV discontinued without complications. 
Medications sent and submitted by Dr Paul. No concerns noted at discharge.

## 2021-11-21 NOTE — PDOC3
[facility]-Discharge Summary


Date of Admission:


Date of Admission:  Nov 2, 2021





Date of Discharge:


Date of Discharge:  Nov 5, 2021





Admission Diagnosis:


Problems:  


(1) Ankle fracture





Discharge Diagnosis:


Discharge Diagnosis:


Same





Consults:


Consults:


Ortho





Hospital Course:


Hospital Course:


Chief Complaint


Postop day 2 ORIF left ankle fracture


Gastric bypass in 2004, bilateral hip placements in 2005.  Patient is Covid 

vaccinated





History of Present Illness


History of Present Illness


11/5


Patient evaluated and examined at bedside.  Discharge to [facility] today.





11/4


Patient evaluated examined at bedside.  Doing well says pain is well controlled.

 Definitely needs rehab placement.  Plan for discharge to [facility] 

tomorrow.





11/3/2021


Patient seen and examined


Discussed with RN


Chart reviewed


His left leg is in a cast


He had some coccygeal pain going for an x-ray today





Disposition:


Disposition/Orders:  D/C to Another Facility





Activity:


Activity:


Resume previous activity





Diet:


Diet:  Regular





Medications:


Home Meds


Active Scripts


Hydrocodone Bit/Acetaminophen (HYDROCODONE-APAP 5-325  **) 1 Tab Tablet, 1 TAB 

PO PRN Q6HRS PRN for PAIN, #18 TAB 0 Refills


   Prov:DANTE VASQUES MD         11/7/21


Multivitamin (One-Daily Multi-Vitamin) 1 Each Tablet, 2 TAB PO DAILY for 

nutrition for 30 Days, #60 TAB 0 Refills


   Prov:HERNÁN HORNE MD         11/5/21


Cholecalciferol (Vitamin D3) (Vitamin D3) 1,250 Mcg Capsule, 1250 MCG PO WEEKLY 

for deficiency for 60 Days, #60 CAP


   Prov:HERNÁN HORNE MD         11/5/21


Protein Supplement (Nutritional Drink Mix) 420 Gm Powder, 420 GM PO DAILY for 

malnutrition for 30 Days, #30 MISC


   Prov:HERNÁN HORNE MD         11/5/21


Magnesium Citrate (MAGNESIUM CITRATE) 100 Mg Tablet, 100 MG PO BID for 

constipation for 60 Days, #120 TAB


   Prov:HERNÁN HORNE MD         11/5/21


Zinc Sulfate (Zinc Sulfate) 50 Mg Tablet, 50 MG PO DAILY for wound healing for 

60 Days, #60 TAB


   Prov:HERNÁN HORNE MD         11/5/21


Oxycodone/Apap 5-325 (PERCOCET 5-325 MG TABLET **) 1 Each Tablet, 1 TAB PO PRN 

TID PRN for SEVERE PAIN 7-10 for 14 Days, #30 TAB


   Prov:HERNÁN HORNE MD         11/5/21


Reported Medications


Acetaminophen (TYLENOL) 325 Mg Tablet, 325 MG PO PRN PRN for PAIN, TAB


   11/2/21


Scheduled


Cholecalciferol (Vitamin D3) (Vitamin D3), 1,250 MCG PO WEEKLY


Magnesium Citrate (Magnesium Citrate), 100 MG PO BID


Multivitamin (One-Daily Multi-Vitamin), 2 TAB PO DAILY


Protein Supplement (Nutritional Drink Mix), 420 GM PO DAILY


Zinc Sulfate (Zinc Sulfate), 50 MG PO DAILY





Scheduled PRN


Acetaminophen (Tylenol), 325 MG PO PRN PRN for PAIN, (Reported)


Hydrocodone Bit/Acetaminophen (Hydrocodone-Apap 5-325  **), 1 TAB PO PRN Q6HRS 

PRN for PAIN


Oxycodone/Apap 5-325 (Percocet 5-325 Mg Tablet **), 1 TAB PO PRN TID PRN for 

SEVERE PAIN 7-10





Justicifation of Admission Dx:


Justifications for Admission:


Justification of Admission Dx:  Yes


Fracture:  Fracture











HERNÁN HORNE MD         Nov 21, 2021 20:56

## 2022-02-16 ENCOUNTER — HOSPITAL ENCOUNTER (INPATIENT)
Dept: HOSPITAL 61 - OPSVCIP | Age: 67
LOS: 3 days | Discharge: HOME | DRG: 494 | End: 2022-02-19
Attending: INTERNAL MEDICINE | Admitting: INTERNAL MEDICINE
Payer: MEDICARE

## 2022-02-16 VITALS — SYSTOLIC BLOOD PRESSURE: 93 MMHG | DIASTOLIC BLOOD PRESSURE: 58 MMHG

## 2022-02-16 VITALS — WEIGHT: 204.15 LBS | HEIGHT: 70 IN | BODY MASS INDEX: 29.23 KG/M2

## 2022-02-16 VITALS — DIASTOLIC BLOOD PRESSURE: 57 MMHG | SYSTOLIC BLOOD PRESSURE: 105 MMHG

## 2022-02-16 VITALS — SYSTOLIC BLOOD PRESSURE: 99 MMHG | DIASTOLIC BLOOD PRESSURE: 61 MMHG

## 2022-02-16 VITALS — SYSTOLIC BLOOD PRESSURE: 127 MMHG | DIASTOLIC BLOOD PRESSURE: 68 MMHG

## 2022-02-16 VITALS — SYSTOLIC BLOOD PRESSURE: 99 MMHG | DIASTOLIC BLOOD PRESSURE: 53 MMHG

## 2022-02-16 VITALS — DIASTOLIC BLOOD PRESSURE: 56 MMHG | SYSTOLIC BLOOD PRESSURE: 87 MMHG

## 2022-02-16 VITALS — DIASTOLIC BLOOD PRESSURE: 56 MMHG | SYSTOLIC BLOOD PRESSURE: 117 MMHG

## 2022-02-16 VITALS — SYSTOLIC BLOOD PRESSURE: 111 MMHG | DIASTOLIC BLOOD PRESSURE: 53 MMHG

## 2022-02-16 VITALS — SYSTOLIC BLOOD PRESSURE: 100 MMHG | DIASTOLIC BLOOD PRESSURE: 61 MMHG

## 2022-02-16 VITALS — DIASTOLIC BLOOD PRESSURE: 61 MMHG | SYSTOLIC BLOOD PRESSURE: 104 MMHG

## 2022-02-16 VITALS — SYSTOLIC BLOOD PRESSURE: 96 MMHG | DIASTOLIC BLOOD PRESSURE: 56 MMHG
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
## 2022-02-16 DIAGNOSIS — I44.0: ICD-10-CM
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
## 2022-02-16 DIAGNOSIS — M21.40: ICD-10-CM

## 2022-02-16 DIAGNOSIS — S82.842A: Primary | ICD-10-CM

## 2022-02-16 DIAGNOSIS — M21.179: ICD-10-CM

## 2022-02-16 DIAGNOSIS — Z96.643: ICD-10-CM

## 2022-02-16 DIAGNOSIS — M77.9: ICD-10-CM

## 2022-02-16 DIAGNOSIS — F10.10: ICD-10-CM

## 2022-02-16 DIAGNOSIS — Z82.49: ICD-10-CM

## 2022-02-16 DIAGNOSIS — Z98.84: ICD-10-CM

## 2022-02-16 DIAGNOSIS — K59.00: ICD-10-CM
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
## 2022-02-16 PROCEDURE — 0SGL0JZ FUSION OF LEFT TARSOMETATARSAL JOINT WITH SYNTHETIC SUBSTITUTE, OPEN APPROACH: ICD-10-PCS | Performed by: STUDENT IN AN ORGANIZED HEALTH CARE EDUCATION/TRAINING PROGRAM

## 2022-02-16 PROCEDURE — 0SPG04Z REMOVAL OF INTERNAL FIXATION DEVICE FROM LEFT ANKLE JOINT, OPEN APPROACH: ICD-10-PCS | Performed by: STUDENT IN AN ORGANIZED HEALTH CARE EDUCATION/TRAINING PROGRAM

## 2022-02-16 PROCEDURE — A4209 5+ CC STERILE SYRINGE&NEEDLE: HCPCS

## 2022-02-16 PROCEDURE — 80053 COMPREHEN METABOLIC PANEL: CPT

## 2022-02-16 PROCEDURE — 0MQR0ZZ REPAIR LEFT ANKLE BURSA AND LIGAMENT, OPEN APPROACH: ICD-10-PCS | Performed by: STUDENT IN AN ORGANIZED HEALTH CARE EDUCATION/TRAINING PROGRAM

## 2022-02-16 PROCEDURE — A6450 LT COMPRES BAND >=5"/YD: HCPCS

## 2022-02-16 PROCEDURE — A6402 STERILE GAUZE <= 16 SQ IN: HCPCS

## 2022-02-16 PROCEDURE — 36415 COLL VENOUS BLD VENIPUNCTURE: CPT

## 2022-02-16 PROCEDURE — C1776 JOINT DEVICE (IMPLANTABLE): HCPCS

## 2022-02-16 PROCEDURE — G0378 HOSPITAL OBSERVATION PER HR: HCPCS

## 2022-02-16 PROCEDURE — 85025 COMPLETE CBC W/AUTO DIFF WBC: CPT

## 2022-02-16 PROCEDURE — 73630 X-RAY EXAM OF FOOT: CPT

## 2022-02-16 PROCEDURE — A4930 STERILE, GLOVES PER PAIR: HCPCS

## 2022-02-16 PROCEDURE — A6253 ABSORPT DRG > 48 SQ IN W/O B: HCPCS

## 2022-02-16 PROCEDURE — C1713 ANCHOR/SCREW BN/BN,TIS/BN: HCPCS

## 2022-02-16 PROCEDURE — C1769 GUIDE WIRE: HCPCS

## 2022-02-16 PROCEDURE — A6223 GAUZE >16<=48 NO W/SAL W/O B: HCPCS

## 2022-02-16 PROCEDURE — A6449 LT COMPRES BAND >=3" <5"/YD: HCPCS

## 2022-02-16 PROCEDURE — 76000 FLUOROSCOPY <1 HR PHYS/QHP: CPT

## 2022-02-16 PROCEDURE — A6455 SELF-ADHER BAND >=5"/YD: HCPCS

## 2022-02-16 RX ADMIN — SENNOSIDES AND DOCUSATE SODIUM SCH TAB: 8.6; 5 TABLET ORAL at 21:07

## 2022-02-16 RX ADMIN — HYDROMORPHONE HYDROCHLORIDE PRN MG: 2 INJECTION INTRAMUSCULAR; INTRAVENOUS; SUBCUTANEOUS at 16:22

## 2022-02-16 RX ADMIN — FENTANYL CITRATE PRN MCG: 50 INJECTION INTRAMUSCULAR; INTRAVENOUS at 15:52

## 2022-02-16 RX ADMIN — MORPHINE SULFATE PRN MG: 2 INJECTION, SOLUTION INTRAMUSCULAR; INTRAVENOUS at 15:55

## 2022-02-16 RX ADMIN — MORPHINE SULFATE PRN MG: 2 INJECTION, SOLUTION INTRAMUSCULAR; INTRAVENOUS at 15:43

## 2022-02-16 RX ADMIN — HYDROMORPHONE HYDROCHLORIDE PRN MG: 2 INJECTION INTRAMUSCULAR; INTRAVENOUS; SUBCUTANEOUS at 16:34

## 2022-02-16 RX ADMIN — PROCHLORPERAZINE EDISYLATE PRN MG: 5 INJECTION INTRAMUSCULAR; INTRAVENOUS at 16:19

## 2022-02-16 RX ADMIN — HYDROCODONE BITARTRATE AND ACETAMINOPHEN PRN TAB: 5; 325 TABLET ORAL at 22:58

## 2022-02-16 RX ADMIN — HYDROMORPHONE HYDROCHLORIDE PRN MG: 2 INJECTION INTRAMUSCULAR; INTRAVENOUS; SUBCUTANEOUS at 16:45

## 2022-02-16 RX ADMIN — HYDROMORPHONE HYDROCHLORIDE PRN MG: 2 INJECTION INTRAMUSCULAR; INTRAVENOUS; SUBCUTANEOUS at 16:12

## 2022-02-16 RX ADMIN — PSYLLIUM HUSK SCH PKT: 3.4 POWDER ORAL at 21:07

## 2022-02-16 RX ADMIN — FENTANYL CITRATE PRN MCG: 50 INJECTION INTRAMUSCULAR; INTRAVENOUS at 15:43

## 2022-02-16 RX ADMIN — PROCHLORPERAZINE EDISYLATE PRN MG: 5 INJECTION INTRAMUSCULAR; INTRAVENOUS at 16:11

## 2022-02-16 NOTE — PDOC1
History and Physical


Date of Admission


Date of Admission


DATE: 2/16/22 


TIME: 08:32





Identification/Chief Complaint


Chief Complaint


Left ankle pain





Source


Source:  Patient





History of Present Illness


History of Present Illness


Mr Smith is a 67 yo male with PMHx s/p gastric bypass, s/p bilateral hip 

replacement, and recent complicated ankle fracture in October 2021 status post 

repair 11/2/2021 who comes in with persistent left ankle pain and instability.


Being seen today for preoperative physical for left ankle hardware removal 

deltoid ligament repair spring ligament repair first tarsometatarsal joint 

fusion possible flexor digitorum longus transfer.


He is s/p gastric bypass in 2004, bilateral hip placements in 2005.  Patient is 

Covid vaccinated with Pfizer vaccine.  Status post ORIF left ankle 11/2/2021.  

He is lost 30 pounds since his ankle surgery he and his wife think due to pain. 

Due to his weight loss Dr. Brandon referred him to Dr. Mak and he had a 

colonoscopy approximately 2 weeks ago.


Seen bedside.  Had some vagal response with IV insertion this morning.  No 

recent travel or sick contacts.  Other than above weight loss and left ankle 

persistent pain and development of flatfoot he has no other complaints.


10/2021 EKG Sinus rhythm 59 bpm, left axis deviation, first-degree AV block with

MS interval 202, QTc 46, T wave inversion lead III and aVF, no ST elevation or 

ST depression


Rapid covid 19 negative, no other labs at this time.





Past Surgical History


Past Surgical History


Gastric bypass 2004


Past Surgical History:  Total hip replacement (Bilateral - 2005), Other (Left 

ankle surgery)





Family History


Family History:  High Cholestrol, Hypertension





Social History


Smoke:  No


ALCOHOL:  heavy





Current Medications


Current Medications





Current Medications


Fentanyl Citrate (Fentanyl 2ml Vial) 25 mcg PRN Q5MIN  PRN IVP MILD PAIN 1-3;  

Start 2/16/22 at 06:00;  Stop 2/17/22 at 05:59


Fentanyl Citrate (Fentanyl 2ml Vial) 50 mcg PRN Q5MIN  PRN IVP MODERATE PAIN 4-

6;  Start 2/16/22 at 06:00;  Stop 2/17/22 at 05:59


Morphine Sulfate (Morphine Sulfate) 1 mg PRN Q10MIN  PRN IVP SEVERE PAIN 7-10;  

Start 2/16/22 at 06:00;  Stop 2/17/22 at 05:59


Ringer's Solution 1,000 ml @  30 mls/hr Q24H IV  Last administered on 2/16/22at 

08:06;  Start 2/16/22 at 06:00;  Stop 2/16/22 at 17:59


Hydromorphone HCl (Dilaudid) 0.5 mg PRN Q10MIN  PRN IVP SEVERE PAIN 7-10, 2nd 

CHOICE;  Start 2/16/22 at 06:00;  Stop 2/17/22 at 05:59


Prochlorperazine Edisylate (Compazine) 5 mg PACU PRN  PRN IVP NAUSEA, MRX1;  

Start 2/16/22 at 06:00;  Stop 2/17/22 at 05:59


Cefazolin Sodium/ Dextrose 50 ml @  100 mls/hr 1X PREOP  PRN IV PRIOR TO 

PROCEDURE;  Start 2/16/22 at 06:00;  Stop 2/16/22 at 18:00


Propofol (Diprivan) 200 mg STK-MED ONCE IV ;  Start 2/16/22 at 08:16;  Stop 

2/16/22 at 08:17;  Status DC


Lidocaine HCl (Xylocaine-Mpf 1% 5ml Vial) 5 ml STK-MED ONCE .ROUTE ;  Start 

2/16/22 at 08:16;  Stop 2/16/22 at 08:17;  Status DC


Phenylephrine HCl (PHENYLEPHRINE in 0.9% NACL PF) 1 mg STK-MED ONCE IV ;  Start 

2/16/22 at 08:17;  Stop 2/16/22 at 08:17;  Status DC


Fentanyl Citrate (Fentanyl 2ml Vial) 100 mcg STK-MED ONCE .ROUTE ;  Start 

2/16/22 at 08:17;  Stop 2/16/22 at 08:17;  Status DC





Active Scripts


Active


Hydrocodone-Apap 5-325  ** (Hydrocodone Bit/Acetaminophen) 1 Tab Tablet 1 Tab PO

PRN Q6HRS PRN


One-Daily Multi-Vitamin (Multivitamin) 1 Each Tablet 2 Tab PO DAILY 30 Days


Vitamin D3 (Cholecalciferol (Vitamin D3)) 1,250 Mcg Capsule 1,250 Mcg PO WEEKLY 

60 Days


Nutritional Drink Mix (Protein Supplement) 420 Gm Powder 420 Gm PO DAILY 30 Days


Magnesium Citrate 100 Mg Tablet 100 Mg PO BID 60 Days


Zinc Sulfate 50 Mg Tablet 50 Mg PO DAILY 60 Days


Percocet 5-325 Mg Tablet ** (Oxycodone/Acetaminophen) 1 Each Tablet 1 Tab PO PRN

TID PRN 14 Days


Reported


Tylenol (Acetaminophen) 325 Mg Tablet 325 Mg PO PRN PRN





Allergies


Allergies:  


Coded Allergies:  


     No Known Drug Allergies (Unverified , 11/2/21)





ROS


General:  No: Chills, Night Sweats, Fatigue, Malaise, Appetite, Other


PSYCHOLOGICAL ROS:  No: Anxiety, Behavioral Disorder, Concentration difficultie,

Decreased libido, Depression, Disorientation, Hallucinations, Hostility, 

Irritablity, Memory difficulties, Mood Swings, Obsessive thoughts, Physical 

abuse, Sexual abuse, Sleep disturbances, Suicidal ideation, Other


Eyes:  No Blurry vision, No Decreased vision, No Double vision, No Dry eyes, No 

Excessive tearing, No Eye Pain, No Itchy Eyes, No Loss of vision, No 

Photophobia, No Scotomata, No Uses contacts, No Uses glasses, No Other


HEENT:  No: Heacaches, Visual Changes, Hearing change, Nasal congestion, Nasal 

discharge, Oral lesions, Sinus pain, Sore Throat, Epistaxis, Sneezing, Snoring, 

Tinnitus, Vertigo, Vocal changes, Other


ALLERGY AND IMMUNOLOGY:  No: Hives, Insect Bite Sensitivity, Itchy/Watery Eyes, 

Nasal Congestion, Post Nasal Drip, Seasonal Allergies, Other


Hematological and Lymphatic:  No: Bleeding Problems, Blood Clots, Blood 

Transfusions, Brusing, Night Sweats, Pallor, Swollen Lymph Nodes, Other


ENDOCRINE:  No: Breast Changes, Galactorrhea, Hair Pattern Changes, Hot Flashes,

Malaise/lethargy, Mood Swings, Palpitations, Polydipsia/polyuria, Skin Changes, 

Temperature Intolerance, Unexpected Weight Changes, Other


Breast:  No New/Changing Breast Lumps, No Nipple changes, No Nipple discharge, 

No Other


Respiratory:  No: Cough, Hemoptysis, Orthopnea, Pleuritic Pain, Shortness of 

breath, SOB with excertion, Sputum Changes, Stridor, Tachypnea, Wheezing, Other


Cardiovascular:  No Chest Pain, No Palpitations, No Orthopnea, No Paroxysmal 

Noc. Dyspnea, No Edema, No Lt Headedness, No Other


Gastrointestinal:  No Nausea, No Vomiting, No Abdominal Pain, No Diarrhea, No 

Constipation, No Melena, No Hematochezia, No Other


Genitourinary:  No Dysuria, No Frequency, No Incontinence, No Hematuria, No 

Retention, No Discharge, No Urgency, No Pain, No Flank Pain, No Other, No , No ,

No , No , No , No , No 


Musculoskeletal:  Yes Gait Disturbance, Yes Joint Pain, Yes Joint Stiffness; 


   No Joint Swelling, No Muscle Pain, No Muscular Weakness, No Pain In:, No 

Swelling In:, No Other


Neurological:  No Behavorial Changes, No Bowel/Bladder ControlChng, No 

Confusion, No Dizziness, No Gait Disturbance, No Headaches, No Impaired 

Coord/balance, No Memory Loss, No Numbness/Tingling, No Seizures, No Speech 

Problems, No Tremors, No Visual Changes, No Weakness, No Other


Skin:  No Dry Skin, No Eczema, No Hair Changes, No Lumps, No Mole Changes, No 

Mottling, No Nail Changes, No Pruritus, No Rash, No Skin Lesion Changes, No 

Other, No Acne





Physical Exam


General:  Alert, Oriented X3, Cooperative, No acute distress


HEENT:  Atraumatic, PERRLA, EOMI, Mucous membr. moist/pink


Lungs:  Clear to auscultation, Normal air movement


Heart:  S1S2, RRR, no thrills, no rubs, no gallops, no murmurs


Abdomen:  Normal bowel sounds, Soft, No tenderness, No hepatosplenomegaly, No 

masses


Rectal Exam:  not examined


Extremities:  No clubbing, No cyanosis, No edema, Normal pulses, Other (left 

ankle tender and swollen)


Skin:  No rashes, No breakdown, No significant lesion


Neuro:  Normal gait, Normal speech, Strength at 5/5 X4 ext, Normal tone, 

Sensation intact, Cranial nerves 3-12 NL, Reflexes 2+


Psych/Mental Status:  Mental status NL, Mood NL





Vitals


Vitals





Vital Signs








  Date Time  Temp Pulse Resp B/P (MAP) Pulse Ox O2 Delivery O2 Flow Rate FiO2


 


2/16/22 08:02 97.9 60 20  98   





 97.9       


 


2/16/22 07:52    117/56  Room Air  











VTE Prophylaxis Ordered


VTE Prophylaxis Devices:  Yes


VTE Pharmacological Prophylaxi:  Yes





Assessment/Plan


Assessment/Plan


A/P:


Left ankle pain -status post ORIF November 2021. No further testing prior to 

planned hardware removal and tendon repair.


Unintentional weight loss - just had repeat colonoscopy 2 weeks ago with Dr. Paul


S/p Gastric Bypass 2004


Alcohol use disorder - no history of admission for withdrawal. Will place on 

CIWA post-operatively.





FEN - NPO


PPX - lovenox


FULL CODE


Dispo - will likely need inpatient admission post-operatively for pain control 

as he will likely need acute rehab vs SNF as he will likely have non-

weightbearing status for 6 weeks





Justifications for Admission


Other Justification


Left ankle fracture











HERNÁN ROUSSEAU MD        Feb 16, 2022 08:59

## 2022-02-16 NOTE — PDOC4
OPERATIVE NOTE


Date:


Date:  Feb 16, 2022





Pre-Op Diagnosis:


PT tendinitis, deltoid ligament insufficiency, pes planus, externally reduced 

syndesmotic joint, Left





Post-Op Diagnosis:


PT tendinitis, deltoid ligament insufficiency, pes planus, externally reduced 

syndesmotic joint, Left





Procedure Performed:


First tarsometatarsal joint fusion, deltoid ligament repair, PT tendon repair, 

syndesmotic joint stabilization, hardware removal, left





Surgeon:


Raman Miles DPM





Anesthesia Type:


General





Blood Loss:


50 cc





Specimans Obtained:


None





Findings:


PT tendon was found with mild to moderate adipose tissue 

replacement/degeneration with a partial split tear at the posterior medial 

malleolus without significant hypertrophy or structural insufficiency.


The medial ankle gutter was found with significant scar tissue including 

previously avulsed a small osseous fragments.  After medial gutter 

decompression, the talus was able to derotate better and sits in a near 

anatomical mortise position.


Preoperative contralateral, right ankle x-ray was taken in the OR.  The fibula 

was found sitting more posterior relative to the talus with the anterior fibular

margin bisecting tibial plafond.  This remarked the uninjured ankle also had 

externally rotated fibula relative to the tibial incisure at baseline.





Complications:


None





Operative Note:


Indication:


Patient initially presented to my clinic with an inadequately reduced left 

bimalleolar ankle fracture with significant medial gutter widening.  Coupled 

with significant medial and distal ankle serous blisters, the index procedure 

consisted of fibula fracture reduction and indirect medial malleolar repair with

syndesmotic screws.  Patient was progressing well from the ankle pain and 

instability but started noticing 4 out of 10 pain along the PT tendon medially. 

Further weightbearing x-ray of the foot and ankle remarked moderately externally

rotated fibula at the ankle mortise and slight valgus talar tilt due to the 

fibula position.  In addition, there was significant forefoot varus or medial 

column instability contributing to additional subtalar joint pronation.  

Otherwise, the foot x-ray was insignificant for cc joint abduction angle, TN 

uncoverage, or collapsed arch height.  There was no significant ankle equinus.


Then the decision was made to revise the syndesmotic joint reduction/repair by 

taking out the screws and replacing them with syndesmotic Endobutton to allow 

the flexibility.  Also to further address the medial gutter diastases and talar 

tilt, I recommended PT tendon repair, deltoid ligament, including ATFL and TCL, 

tightening and reconstruction to derotate the talus within ankle mortise.  I 

also recommended first TMT arthrodesis to stabilize the medial column more so 

than a bunion correction, to prevent excessive subtalar joint pronation.  At 

baseline, the heel sits slightly in a varus a position nd additional medializing

calcaneal osteotomy was not recommended.  Patient verbalized understanding 

consented for the procedure.











Under mild sedation, patient was brought into the operating room and placed on 

operating table in the supine position.  A formal timeout was carried out to 

confirm patient's identity, procedure and procedure site.  Following IV 

anesthesia and preoperative antibiotics, a well-padded left thigh tourniquet was

applied.  The left lower extremity was then prepped, draped and scrubbed using 

aseptic techniques.  The left lower extremity was then exsanguinated and the 

pressure was set at 250 mmHg.





Before the surgical left lower extremity was scrubbed and draped, intraoperative

x-ray was used to visualize and remarked a baseline of fibula position relative 

to the tibia within the incisure.  A lateral view of the right ankle remarked 

the anterior margin of the fibula bisected the distal tibial plafond.  So at 

baseline, the fibula was externally rotated relative to the tibia on the 

uninjured right ankle.





After the left lower extremity was prepped and draped, the attention was 

directed to the lateral fibula near the syndesmotic screw sites.  A linear i

ncision was made and carried deep with a combination of sharp and blunt 

dissection with care to protect and retract all the neurovascular bundles.  

After the  syndesmotic screws were visualized, they were removed in toto without

any breakage.  The surgical site was irrigated and covered with a wet Ray-Ny.





The attention was directed to the medial malleolus.  A curvilinear incision was 

made over the dorsal aspect of the PT tendon extending from the posterior distal

tibia to the proximal navicula.  The incision was carried deep with a 

combination of sharp and blunt dissection with care to protect and retract all 

the neurovascular bundle.  Upon deeper blunt dissection, the medial ankle 

capsule was hypertrophied.  The medial gutter was impacted with scar tissue, 

previously avulsed osseous fragments.  After medial gutter decompression, the 

talus was able to rotate medially and close off the medial diastases.  Then the 

attention was directed to the PT tendon where a medial capsulotomy was performed

and remarked slightly degenerated PT tendon with a partial plit tear at the 

posterior distal medial malleolus.  The degenerated tendon was excised and the 

split tear was repaired with 2-0 FiberWire.  The sustentaculum shayy was 

identified with a K wire and confirmed on lateral and axial view of the foot.  





After the medial gutter was adequately decompressed, the attention was 

redirected into the lateral fibula for syndesmotic joint reduction.  Using a 

sharp reduction clamp, the fibula was found to difficulty with internal rotation

reduction.  Then the decision was made to make a stab incision measures 

approximately 1 cm anterior to the distal syndesmotic joint.  Incision was 

carried deep to expose the distal tib-fib capsule/syndesmotic joint.  Care was 

taken to protect and retract all the neurovascular bundles.  The proximal and 

posterior syndesmotic scar tissue was released carefully with a small osteotome.

 And subsequent fibular reduction with internal rotation within the insicure was

noted.  AP view of the ankle remarked reduced medial gutter diastases, less 

valgus talar tilt.  Lateral view of the ankle remarked the anterior margin of 

the distal fibula bisected the distal tibial plafond, comparable to the 

contralateral side.  Then 2x Arthrex tight ropes were placed through the same 

fibular lock holes intended for the syndesmotic repair, and tightened to 

physiological tension.  Post reduction x-ray noted adequate ankle mortise 

reduction and tight rope placement.  Stress test at the AITFL was insignificant 

for instability.  Therefore, no further TFL ligament stabilization was needed.





Then the attention was redirected to the distal medial malleolus where a 4.75 mm

swivel lock was placed into the intracanalicular groove but extra-articular to 

the ankle joint with the intraoperative x-ray guidance.  2 suture arms were 

anchored to the anterior lateral talar body, extra-articular, with a 3.5 mm 

swivel lock; and to the sustentaculum shayy with a 3.5 mm swivel lock after x-ray

confirmation and soft tissue retraction.  Physiological tension to both ATTL and

TCL was achieved with ankle held in near neutral dorsiflexion and slight 

eversion.  At this time, stress test across the subtalar joint was insignificant

for overpronation or overtightening.  The PT tendon sheath was repaired with 2-0

Vicryl.  The remaining of the superficial deltoid ligament was repaired with a 

combination of 2-0 Vicryl and 2-0 FiberWire with ankle held neutral and slightly

everted.  





Again intraoperative x-ray remarked significantly reduced medial gutter diastase

s and free from osseous avulsion fragments.  There was a slight valgus talar 

tilt but improved significantly with ankle joint dorsiflexion during simulated 

weightbearing.  The fibula position within the incicure was comparable to the 

contralateral uninjured ankle.  The fibula remain out to length without any 

shortening.





All the surgical sites were irrigated with copious saline solutions and closed 

in layers with 4-0 Monocryl and 4-0 nylon.  At this time, the tourniquet was 

deflated at 2-hour alfonzo.  And hemostasis was achieved with gauze, Coban 

compression.





After 20 minutes of tourniquet deflation, the left lower extremity was reex

sanguinated with pressure set at 250 mmHg.  Then the attention was directed to 

the dorsomedial aspect of the first TMT where a linear incision was made just 

medial to the EHL.  The incision was carried deep with a combination of sharp 

and blunt dissection with care to protect and retract all the neurovascular 

bundles.  The tibialis anterior tendon and EHL were mobilized after retinaculum 

release to expose the first TMT capsule.  A transverse capsulotomy was carried 

out with care to protect the lateral neurovascular bundle.  The joint was 

visualized with a smooth laminar  and joint prep was carried out with a 

combination of osteotome, curette.  Subchondral drilling was achieved with a 

combination of 2 oh millimeter drill bit and a small osteotome.  Healthy 

subchondral bleeding was noted.  To further assist first TMT reduction, a 

percutaneous lateral suspensory fibular sesamoid release was performed with a 

[place] blade at the level of the lateral first TMT under  x-ray guidance.  

Afterwards, adequate mobilization of the fibula sesamoid was noted on AP x-ray. 

Then using a windlass maneuver, the first TMT was reduced and temporarily 

stabilized with two 0.064 inch K wires.  Intraoperative x-ray remarked adequate 

joint apposition, plantar translation of the first metatarsal and stabilization 

of the medial column.  Although, the first intermetatarsal angle was not reduced

under 8 degrees.  Additional distal osteotomy could have been performed for 

bunion but the goal of the first TMT surgery was to stabilize the medial column 

to prevent subtalar joint overpronation.  Then a 4.0 mm partially-threaded screw

was traversed from dorsal distal first metatarsal to the base of the plantar 

first cuneiform.  And then the construct was reinforced with a Yaz Putnam

plate using a combination of 3.5 and a 3.0 millimeter screws.  The fusion site 

was further augmented with 1 cc of DBM.  At this time, the first TMT was noted 

well opposed with adequate hardware fixation without any gapping or instability.

 After saline solution irrigation, the surgical site was closed in layers with 

3-0 Vicryl, 4-0 Monocryl and 4 nylon.





20 cc of 0.25% Marcaine plain was infiltrated into the surgical sites for 

postoperative pain control.  The surgical sites were dressed with jumpstart from

Arthrex, 4 x 4 gauze.  The left lower extremity was immobilized in a well padded

Dash compression splint with ankle held near 90 degrees.  Tourniquet was 

deflated and adequate digital perfusion was noted.





Patient tolerated anesthesia and procedure well with vital signs stable and 

neurovascular status intact.  Patient was then transferred to PACU for 

continuous recovery and additional foot x-ray.  The x-ray for the ankle was 

saved from the mini C arm intraoperatively.











RAMAN MILES DPM               Feb 16, 2022 15:04

## 2022-02-16 NOTE — RAD
XR FOOT_LEFT 3 VIEWS 2/16/2022 3:25 PM



INDICATION: Postoperative



COMPARISON: Left foot radiograph to 322



TECHNIQUE: 3 views of the left foot are provided.



FINDINGS/

IMPRESSION:

 Evaluation is degraded by overlapping cast material. There is arthrodesis of the first tarsometatars
al joint and medial and middle cuneiform. No lucency surrounding the hardware. Mild hallux valgus def
ormity. Hardware within the tibia and fibula is only partially profiled. 



Electronically signed by: Kim Becker MD (2/16/2022 4:08 PM) [id number]

## 2022-02-17 VITALS — SYSTOLIC BLOOD PRESSURE: 107 MMHG | DIASTOLIC BLOOD PRESSURE: 66 MMHG

## 2022-02-17 VITALS — DIASTOLIC BLOOD PRESSURE: 45 MMHG | SYSTOLIC BLOOD PRESSURE: 98 MMHG

## 2022-02-17 VITALS — DIASTOLIC BLOOD PRESSURE: 69 MMHG | SYSTOLIC BLOOD PRESSURE: 116 MMHG

## 2022-02-17 VITALS — DIASTOLIC BLOOD PRESSURE: 61 MMHG | SYSTOLIC BLOOD PRESSURE: 131 MMHG

## 2022-02-17 VITALS — SYSTOLIC BLOOD PRESSURE: 105 MMHG | DIASTOLIC BLOOD PRESSURE: 66 MMHG

## 2022-02-17 VITALS — DIASTOLIC BLOOD PRESSURE: 54 MMHG | SYSTOLIC BLOOD PRESSURE: 111 MMHG

## 2022-02-17 LAB
ALBUMIN SERPL-MCNC: 3.1 G/DL (ref 3.4–5)
ALBUMIN/GLOB SERPL: 1.1 {RATIO} (ref 1–1.7)
ALP SERPL-CCNC: 56 U/L (ref 46–116)
ALT SERPL-CCNC: 25 U/L (ref 16–63)
ANION GAP SERPL CALC-SCNC: 8 MMOL/L (ref 6–14)
AST SERPL-CCNC: 12 U/L (ref 15–37)
BASOPHILS # BLD AUTO: 0 X10^3/UL (ref 0–0.2)
BASOPHILS NFR BLD: 0 % (ref 0–3)
BILIRUB SERPL-MCNC: 0.5 MG/DL (ref 0.2–1)
BUN SERPL-MCNC: 9 MG/DL (ref 8–26)
BUN/CREAT SERPL: 13 (ref 6–20)
CALCIUM SERPL-MCNC: 8 MG/DL (ref 8.5–10.1)
CHLORIDE SERPL-SCNC: 106 MMOL/L (ref 98–107)
CO2 SERPL-SCNC: 29 MMOL/L (ref 21–32)
CREAT SERPL-MCNC: 0.7 MG/DL (ref 0.7–1.3)
EOSINOPHIL NFR BLD: 0 % (ref 0–3)
EOSINOPHIL NFR BLD: 0 X10^3/UL (ref 0–0.7)
ERYTHROCYTE [DISTWIDTH] IN BLOOD BY AUTOMATED COUNT: 14.9 % (ref 11.5–14.5)
GFR SERPLBLD BASED ON 1.73 SQ M-ARVRAT: 112.1 ML/MIN
GLUCOSE SERPL-MCNC: 96 MG/DL (ref 70–99)
HCT VFR BLD CALC: 31.7 % (ref 39–53)
HGB BLD-MCNC: 10.6 G/DL (ref 13–17.5)
LYMPHOCYTES # BLD: 1.2 X10^3/UL (ref 1–4.8)
LYMPHOCYTES NFR BLD AUTO: 16 % (ref 24–48)
MCH RBC QN AUTO: 27 PG (ref 25–35)
MCHC RBC AUTO-ENTMCNC: 34 G/DL (ref 31–37)
MCV RBC AUTO: 79 FL (ref 79–100)
MONO #: 0.7 X10^3/UL (ref 0–1.1)
MONOCYTES NFR BLD: 10 % (ref 0–9)
NEUT #: 5.3 X10^3/UL (ref 1.8–7.7)
NEUTROPHILS NFR BLD AUTO: 74 % (ref 31–73)
PLATELET # BLD AUTO: 162 X10^3/UL (ref 140–400)
POTASSIUM SERPL-SCNC: 3.7 MMOL/L (ref 3.5–5.1)
PROT SERPL-MCNC: 6 G/DL (ref 6.4–8.2)
RBC # BLD AUTO: 4.01 X10^6/UL (ref 4.3–5.7)
SODIUM SERPL-SCNC: 143 MMOL/L (ref 136–145)
WBC # BLD AUTO: 7.2 X10^3/UL (ref 4–11)

## 2022-02-17 RX ADMIN — MULTIPLE VITAMINS W/ MINERALS TAB SCH TAB: TAB at 16:08

## 2022-02-17 RX ADMIN — PSYLLIUM HUSK SCH PKT: 3.4 POWDER ORAL at 20:17

## 2022-02-17 RX ADMIN — SENNOSIDES AND DOCUSATE SODIUM SCH TAB: 8.6; 5 TABLET ORAL at 08:30

## 2022-02-17 RX ADMIN — ENOXAPARIN SODIUM SCH MG: 40 INJECTION SUBCUTANEOUS at 08:30

## 2022-02-17 RX ADMIN — HYDROCODONE BITARTRATE AND ACETAMINOPHEN PRN TAB: 5; 325 TABLET ORAL at 12:02

## 2022-02-17 RX ADMIN — GABAPENTIN SCH MG: 100 CAPSULE ORAL at 20:16

## 2022-02-17 RX ADMIN — FOLIC ACID SCH MG: 1 TABLET ORAL at 14:00

## 2022-02-17 RX ADMIN — HYDROCODONE BITARTRATE AND ACETAMINOPHEN PRN TAB: 5; 325 TABLET ORAL at 04:20

## 2022-02-17 RX ADMIN — SENNOSIDES AND DOCUSATE SODIUM SCH TAB: 8.6; 5 TABLET ORAL at 20:16

## 2022-02-17 RX ADMIN — HYDROCODONE BITARTRATE AND ACETAMINOPHEN PRN TAB: 5; 325 TABLET ORAL at 16:07

## 2022-02-17 RX ADMIN — Medication SCH MG: at 16:08

## 2022-02-17 RX ADMIN — CHOLECALCIFEROL CAP 125 MCG (5000 UNIT) SCH UNIT: 125 CAP at 20:16

## 2022-02-17 RX ADMIN — HYDROCODONE BITARTRATE AND ACETAMINOPHEN PRN TAB: 5; 325 TABLET ORAL at 20:17

## 2022-02-17 RX ADMIN — HYDROCODONE BITARTRATE AND ACETAMINOPHEN PRN TAB: 5; 325 TABLET ORAL at 08:31

## 2022-02-17 NOTE — NUR
SS following for discharge planning. SS reviewed pt chart and discussed with pt RN. Pt is 
from home with spouse and is currently on room air. COVID19 negative. Surgery on 2/16/2022. 
PT/OT ordered. OT recommended home independent. SS will continue to follow for discharge 
planning.

## 2022-02-17 NOTE — PDOC
PROGRESS NOTES


Date of Service


DATE: 2/17/22 


TIME: 17:50





Subjective


Subjective


[DOS 2/16] 


Left syndesmotic joint stabilization, hardware removal, deltoid ligament repair,

PT tendon repair, first TMT fusion


Patient was seen resting comfortably in bed with foot elevated and near the 

heart level.  Patient relates episodic sharp pain to the dorsal foot and ankle, 

upwards to 6 out of 10.  Otherwise, he denies any persistent numbness or tingl

ing sensation.  The surgical dressing has been kept clean and dry without any 

strikethrough. Patient further denies any constitutional symptoms.  Patient has 

not yet worked with PT.  





S/p two doses of 2 g Ancef after surgery without any intolerance or adverse 

reaction.





Objective


Objective





Vital Signs








  Date Time  Temp Pulse Resp B/P (MAP) Pulse Ox O2 Delivery O2 Flow Rate FiO2


 


2/17/22 16:07      Room Air  


 


2/17/22 15:15 97.8 84 20 107/66 (80) 97  2.0 





 97.8       














Intake and Output 


 


 2/17/22





 07:00


 


Intake Total 2620 ml


 


Output Total 650 ml


 


Balance 1970 ml


 


 


 


Intake Oral 1020 ml


 


IV Total 1600 ml


 


Output Urine Total 600 ml


 


Estimated Blood Loss 50 ml











Physical Exam


Physical Exam


General: AOx3, pleasant without distress


Left lower extremity focused


Dermatology:


-Postoperative splint and dressing are in place without any strikethrough


Vascular:


-Foot is warm to touch


-CFT less than 5 seconds


Neurology:


-Intact light touch sensation to the level of 1 through 5


Musculoskeletal:


-Able to move digits 1 through 5


-Calf is soft and nontender


-Muscle strength and joint range of motion was deferred





Assessment


Assessment


S/p [DOS 2/16] left syndesmotic joint stabilization, hardware removal, deltoid 

ligament repair, PT tendon repair, first TMT fusion and first intercuneiform 

stabilization





-Keep the postoperative dressing and splint clean, dry and intact 


-Weightbearing restriction: Strict nonweightbearing to the left lower extremity


-Physical therapy: Help with sit, pivot and transfer with any gait aid


-Nurse communication: 


              - Elevate the surgical foot with at least two pillows behind the 

calf so that the heel is floated with toes elevated to the level of the heart, 

per tolerance 45 min/h


               -Ice behind the knee 15 mins/h for pain as needed


-Pain management: Scheduled Tylenol, gabapentin, as needed Norco


-DVT prophylaxis: Currently Lovenox


-Encouraged compliance with incentive spirometry





Dispo: Home discharge pending PT progression and pain management.  Patient may 

likely go home on 2/19.  I have prescribed postoperative pain medicine including

Norco, gabapentin; DVT prophylaxis with Lovenox; bone health with vitamin D3.  

Our office will call patient for postoperative appointment.





Comment


Review of Relevant


I have reviewed the following items alfonzo (where applicable) has been applied.


Labs





Laboratory Tests








Test


 2/16/22


07:48 2/17/22


06:25


 


POC SARS CoV-2 Antigen


 Negative


(NEGATIVE) 





 


White Blood Count


 


 7.2 x10^3/uL


(4.0-11.0)


 


Red Blood Count


 


 4.01 x10^6/uL


(4.30-5.70)


 


Hemoglobin


 


 10.6 g/dL


(13.0-17.5)


 


Hematocrit


 


 31.7 %


(39.0-53.0)


 


Mean Corpuscular Volume  79 fL () 


 


Mean Corpuscular Hemoglobin  27 pg (25-35) 


 


Mean Corpuscular Hemoglobin


Concent 


 34 g/dL


(31-37)


 


Red Cell Distribution Width


 


 14.9 %


(11.5-14.5)


 


Platelet Count


 


 162 x10^3/uL


(140-400)


 


Neutrophils (%) (Auto)  74 % (31-73) 


 


Lymphocytes (%) (Auto)  16 % (24-48) 


 


Monocytes (%) (Auto)  10 % (0-9) 


 


Eosinophils (%) (Auto)  0 % (0-3) 


 


Basophils (%) (Auto)  0 % (0-3) 


 


Neutrophils # (Auto)


 


 5.3 x10^3/uL


(1.8-7.7)


 


Lymphocytes # (Auto)


 


 1.2 x10^3/uL


(1.0-4.8)


 


Monocytes # (Auto)


 


 0.7 x10^3/uL


(0.0-1.1)


 


Eosinophils # (Auto)


 


 0.0 x10^3/uL


(0.0-0.7)


 


Basophils # (Auto)


 


 0.0 x10^3/uL


(0.0-0.2)


 


Sodium Level


 


 143 mmol/L


(136-145)


 


Potassium Level


 


 3.7 mmol/L


(3.5-5.1)


 


Chloride Level


 


 106 mmol/L


()


 


Carbon Dioxide Level


 


 29 mmol/L


(21-32)


 


Anion Gap  8 (6-14) 


 


Blood Urea Nitrogen  9 mg/dL (8-26) 


 


Creatinine


 


 0.7 mg/dL


(0.7-1.3)


 


Estimated GFR


(Cockcroft-Gault) 


 112.1 





 


BUN/Creatinine Ratio  13 (6-20) 


 


Glucose Level


 


 96 mg/dL


(70-99)


 


Calcium Level


 


 8.0 mg/dL


(8.5-10.1)


 


Total Bilirubin


 


 0.5 mg/dL


(0.2-1.0)


 


Aspartate Amino Transf


(AST/SGOT) 


 12 U/L (15-37) 





 


Alanine Aminotransferase


(ALT/SGPT) 


 25 U/L (16-63) 





 


Alkaline Phosphatase


 


 56 U/L


()


 


Total Protein


 


 6.0 g/dL


(6.4-8.2)


 


Albumin


 


 3.1 g/dL


(3.4-5.0)


 


Albumin/Globulin Ratio  1.1 (1.0-1.7) 








Laboratory Tests








Test


 2/17/22


06:25


 


White Blood Count


 7.2 x10^3/uL


(4.0-11.0)


 


Red Blood Count


 4.01 x10^6/uL


(4.30-5.70)


 


Hemoglobin


 10.6 g/dL


(13.0-17.5)


 


Hematocrit


 31.7 %


(39.0-53.0)


 


Mean Corpuscular Volume 79 fL () 


 


Mean Corpuscular Hemoglobin 27 pg (25-35) 


 


Mean Corpuscular Hemoglobin


Concent 34 g/dL


(31-37)


 


Red Cell Distribution Width


 14.9 %


(11.5-14.5)


 


Platelet Count


 162 x10^3/uL


(140-400)


 


Neutrophils (%) (Auto) 74 % (31-73) 


 


Lymphocytes (%) (Auto) 16 % (24-48) 


 


Monocytes (%) (Auto) 10 % (0-9) 


 


Eosinophils (%) (Auto) 0 % (0-3) 


 


Basophils (%) (Auto) 0 % (0-3) 


 


Neutrophils # (Auto)


 5.3 x10^3/uL


(1.8-7.7)


 


Lymphocytes # (Auto)


 1.2 x10^3/uL


(1.0-4.8)


 


Monocytes # (Auto)


 0.7 x10^3/uL


(0.0-1.1)


 


Eosinophils # (Auto)


 0.0 x10^3/uL


(0.0-0.7)


 


Basophils # (Auto)


 0.0 x10^3/uL


(0.0-0.2)


 


Sodium Level


 143 mmol/L


(136-145)


 


Potassium Level


 3.7 mmol/L


(3.5-5.1)


 


Chloride Level


 106 mmol/L


()


 


Carbon Dioxide Level


 29 mmol/L


(21-32)


 


Anion Gap 8 (6-14) 


 


Blood Urea Nitrogen 9 mg/dL (8-26) 


 


Creatinine


 0.7 mg/dL


(0.7-1.3)


 


Estimated GFR


(Cockcroft-Gault) 112.1 





 


BUN/Creatinine Ratio 13 (6-20) 


 


Glucose Level


 96 mg/dL


(70-99)


 


Calcium Level


 8.0 mg/dL


(8.5-10.1)


 


Total Bilirubin


 0.5 mg/dL


(0.2-1.0)


 


Aspartate Amino Transf


(AST/SGOT) 12 U/L (15-37) 





 


Alanine Aminotransferase


(ALT/SGPT) 25 U/L (16-63) 





 


Alkaline Phosphatase


 56 U/L


()


 


Total Protein


 6.0 g/dL


(6.4-8.2)


 


Albumin


 3.1 g/dL


(3.4-5.0)


 


Albumin/Globulin Ratio 1.1 (1.0-1.7) 








Medications





Current Medications


Fentanyl Citrate (Fentanyl 2ml Vial) 25 mcg PRN Q5MIN  PRN IVP MILD PAIN 1-3;  

Start 2/16/22 at 06:00;  Stop 2/17/22 at 05:59;  Status DC


Fentanyl Citrate (Fentanyl 2ml Vial) 50 mcg PRN Q5MIN  PRN IVP MODERATE PAIN 4-6

Last administered on 2/16/22at 15:52;  Start 2/16/22 at 06:00;  Stop 2/17/22 at 

05:59;  Status DC


Morphine Sulfate (Morphine Sulfate) 1 mg PRN Q10MIN  PRN IVP SEVERE PAIN 7-10 

Last administered on 2/16/22at 15:55;  Start 2/16/22 at 06:00;  Stop 2/17/22 at 

05:59;  Status DC


Ringer's Solution 1,000 ml @  30 mls/hr Q24H IV  Last administered on 2/16/22at 

08:06;  Start 2/16/22 at 06:00;  Stop 2/16/22 at 17:59;  Status DC


Hydromorphone HCl (Dilaudid) 0.5 mg PRN Q10MIN  PRN IVP SEVERE PAIN 7-10, 2nd 

CHOICE Last administered on 2/16/22at 16:45;  Start 2/16/22 at 06:00;  Stop 

2/17/22 at 05:59;  Status DC


Prochlorperazine Edisylate (Compazine) 5 mg PACU PRN  PRN IVP NAUSEA, MRX1 Last 

administered on 2/16/22at 16:19;  Start 2/16/22 at 06:00;  Stop 2/17/22 at 

05:59;  Status DC


Cefazolin Sodium/ Dextrose 50 ml @  100 mls/hr 1X PREOP  PRN IV PRIOR TO 

PROCEDURE Last administered on 2/16/22at 09:45;  Start 2/16/22 at 06:00;  Stop 

2/16/22 at 18:00;  Status DC


Propofol (Diprivan) 200 mg STK-MED ONCE IV ;  Start 2/16/22 at 08:16;  Stop 

2/16/22 at 08:17;  Status DC


Lidocaine HCl (Xylocaine-Mpf 1% 5ml Vial) 5 ml STK-MED ONCE .ROUTE ;  Start 

2/16/22 at 08:16;  Stop 2/16/22 at 08:17;  Status DC


Phenylephrine HCl (PHENYLEPHRINE in 0.9% NACL PF) 1 mg STK-MED ONCE IV ;  Start 

2/16/22 at 08:17;  Stop 2/16/22 at 08:17;  Status DC


Fentanyl Citrate (Fentanyl 2ml Vial) 100 mcg STK-MED ONCE .ROUTE ;  Start 

2/16/22 at 08:17;  Stop 2/16/22 at 08:17;  Status DC


Bupivacaine HCl (Sensorcaine Mpf 0.25%) 30 ml STK-MED ONCE .ROUTE  Last 

administered on 2/16/22at 10:26;  Start 2/16/22 at 09:32;  Stop 2/16/22 at 

09:33;  Status DC


Ondansetron HCl (Zofran) 4 mg STK-MED ONCE .ROUTE ;  Start 2/16/22 at 10:02;  

Stop 2/16/22 at 10:02;  Status DC


Dexamethasone Sodium Phosphate (Decadron) 4 mg STK-MED ONCE .ROUTE ;  Start 

2/16/22 at 10:02;  Stop 2/16/22 at 10:03;  Status DC


Ephedrine Sulfate (ePHEDrine PF IN SALINE SYRINGE) 50 mg STK-MED ONCE IV ;  

Start 2/16/22 at 10:03;  Stop 2/16/22 at 10:03;  Status DC


Fentanyl Citrate (Fentanyl 2ml Vial) 100 mcg STK-MED ONCE .ROUTE ;  Start 

2/16/22 at 10:22;  Stop 2/16/22 at 10:22;  Status DC


Fentanyl Citrate (Fentanyl 2ml Vial) 100 mcg STK-MED ONCE .ROUTE ;  Start 

2/16/22 at 11:04;  Stop 2/16/22 at 11:04;  Status DC


Cefazolin Sodium (Ancef) 1 gm STK-MED ONCE IVP ;  Start 2/16/22 at 13:31;  Stop 

2/16/22 at 13:32;  Status DC


Fentanyl Citrate (Fentanyl 2ml Vial) 100 mcg STK-MED ONCE .ROUTE ;  Start 

2/16/22 at 14:20;  Stop 2/16/22 at 14:21;  Status DC


Acetaminophen (Tylenol) 650 mg 1X  ONCE PO  Last administered on 2/16/22at 

16:08;  Start 2/16/22 at 15:30;  Stop 2/16/22 at 15:31;  Status DC


Oxycodone/ Acetaminophen (Percocet 5/325) 1 tab 1X  ONCE PO  Last administered 

on 2/16/22at 16:08;  Start 2/16/22 at 15:45;  Stop 2/16/22 at 15:46;  Status DC


Fentanyl Citrate (Fentanyl 2ml Vial) 100 mcg STK-MED ONCE .ROUTE ;  Start 

2/16/22 at 15:39;  Stop 2/16/22 at 15:40;  Status DC


Morphine Sulfate (Morphine Sulfate) 2 mg STK-MED ONCE .ROUTE ;  Start 2/16/22 at

15:40;  Stop 2/16/22 at 15:40;  Status DC


Calcium Carbonate/ Glycine (Tums) 500 mg PRN Q3HRS  PRN PO UPSET STOMACH;  Start

2/16/22 at 16:00


Morphine Sulfate (Morphine Sulfate) 1 mg PRN Q1HR  PRN IV PAIN;  Start 2/16/22 

at 16:00


Acetaminophen/ Hydrocodone Bitart (Lortab 5/325) 1 tab PRN Q4HRS  PRN PO MILD 

PAIN 1-3 Last administered on 2/17/22at 12:02;  Start 2/16/22 at 16:00


Acetaminophen/ Hydrocodone Bitart (Lortab 5/325) 2 tab PRN Q4HRS  PRN PO 

MODERATE PAIN, SEVERE PAIN Last administered on 2/17/22at 16:07;  Start 2/16/22 

at 16:00


Senna/Docusate Sodium (Senna Plus) 1 tab BID PO  Last administered on 2/17/22at 

08:30;  Start 2/16/22 at 21:00


Magnesium Hydroxide (Milk Of Magnesia) 2,400 mg PRN Q12HR  PRN PO CONSTIPATION; 

Start 2/16/22 at 16:00


Enoxaparin Sodium (Lovenox 40mg Syringe) 40 mg Q24H SQ  Last administered on 

2/17/22at 08:30;  Start 2/17/22 at 09:00


Psyllium Hydrophilic Mucilloid (Metamucil Fiber Packet) 1 pkt QHS PO ;  Start 

2/16/22 at 21:00


Hydromorphone HCl (Dilaudid) 2 mg STK-MED ONCE .ROUTE ;  Start 2/16/22 at 16:09;

 Stop 2/16/22 at 16:09;  Status DC


Prochlorperazine Edisylate (Compazine) 10 mg STK-MED ONCE .ROUTE ;  Start 

2/16/22 at 16:09;  Stop 2/16/22 at 16:09;  Status DC


Cefazolin Sodium/ Dextrose 50 ml @  100 mls/hr 1X  ONCE IV  Last administered on

2/16/22at 21:07;  Start 2/16/22 at 18:30;  Stop 2/16/22 at 18:59;  Status DC


Multivitamins (Thera M Plus) 1 tab DAILY PO  Last administered on 2/17/22at 

16:08;  Start 2/17/22 at 14:00


Folic Acid (Folic Acid) 1 mg DAILY PO ;  Start 2/17/22 at 14:00


Thiamine Mononitrate (Vitamin B-1) 100 mg DAILY PO  Last administered on 

2/17/22at 16:08;  Start 2/17/22 at 14:00


Lorazepam (Ativan) 1 mg PRN Q1HR  PRN PO For CIWA 8-14;  Start 2/17/22 at 13:45


Lorazepam (Ativan) 2 mg PRN Q1HR  PRN PO For CIWA 15 or greater;  Start 2/17/22 

at 13:45


Lorazepam (Ativan Inj) 1 mg PRN Q1HR  PRN IV For CIWA 8-14;  Start 2/17/22 at 

13:45


Lorazepam (Ativan Inj) 2 mg PRN Q1HR  PRN IV For CIWA 15 or greater;  Start 

2/17/22 at 13:45


Clonidine HCl (Catapres) 0.1 mg PRN Q1HR  PRN PO SBP > 180 or DBP > 100, MRX3;  

Start 2/17/22 at 13:45





Active Scripts


Active


Hydrocodone-Apap 5-325  ** (Hydrocodone Bit/Acetaminophen) 1 Tab Tablet 1 Tab PO

PRN Q6HRS PRN


One-Daily Multi-Vitamin (Multivitamin) 1 Each Tablet 2 Tab PO DAILY 30 Days


Vitamin D3 (Cholecalciferol (Vitamin D3)) 1,250 Mcg Capsule 1,250 Mcg PO WEEKLY 

60 Days


Nutritional Drink Mix (Protein Supplement) 420 Gm Powder 420 Gm PO DAILY 30 Days


Magnesium Citrate 100 Mg Tablet 100 Mg PO BID 60 Days


Zinc Sulfate 50 Mg Tablet 50 Mg PO DAILY 60 Days


Percocet 5-325 Mg Tablet ** (Oxycodone/Acetaminophen) 1 Each Tablet 1 Tab PO PRN

TID PRN 14 Days


Reported


Tylenol (Acetaminophen) 325 Mg Tablet 325 Mg PO PRN PRN


Vitals/I & O





Vital Sign - Last 24 Hours








 2/16/22 2/16/22 2/16/22 2/16/22





 17:56 18:03 18:33 19:00


 


Temp    98.1





    98.1


 


Pulse  82 79 85


 


Resp  18 18 14


 


B/P (MAP)  87/56 (66) 105/57 (73) 99/61 (74)


 


Pulse Ox  92 93 98


 


O2 Delivery Nasal Cannula Nasal Cannula Nasal Cannula Nasal Cannula


 


O2 Flow Rate 2.0 2.0 2.0 2.0


 


    





    





 2/16/22 2/16/22 2/16/22 2/16/22





 20:03 20:18 21:03 22:58


 


Pulse 77  77 


 


Resp    20


 


B/P (MAP) 96/56 (69)  127/68 (87) 


 


Pulse Ox 95  99 99


 


O2 Delivery Nasal Cannula Nasal Cannula Nasal Cannula Nasal Cannula


 


O2 Flow Rate 2.0 2.0 2.0 2.0





 2/16/22 2/16/22 2/17/22 2/17/22





 23:00 23:28 03:00 04:20


 


Temp 98.2  97.7 





 98.2  97.7 


 


Pulse 74  67 


 


Resp 14 18 14 18


 


B/P (MAP) 100/61 (74)  116/69 (85) 


 


Pulse Ox 99 99 99 99


 


O2 Delivery Nasal Cannula Nasal Cannula Nasal Cannula Nasal Cannula


 


O2 Flow Rate 2.0 2.0 2.0 2.0


 


    





    





 2/17/22 2/17/22 2/17/22 2/17/22





 04:50 07:15 08:00 08:31


 


Temp  98.0  





  98.0  


 


Pulse  60  


 


Resp  20  


 


B/P (MAP)  111/54 (73)  


 


Pulse Ox  98  


 


O2 Delivery Nasal Cannula Nasal Cannula Room Air Room Air


 


O2 Flow Rate 2.0 2.0  


 


    





    





 2/17/22 2/17/22 2/17/22 2/17/22





 09:01 11:08 12:02 12:32


 


Temp  98.5  





  98.5  


 


Pulse  64  


 


Resp  18  


 


B/P (MAP)  131/61 (84)  


 


Pulse Ox  97  


 


O2 Delivery Room Air Nasal Cannula Room Air Room Air


 


O2 Flow Rate  2.0  


 


    





    





 2/17/22 2/17/22  





 15:15 16:07  


 


Temp 97.8   





 97.8   


 


Pulse 84   


 


Resp 20   


 


B/P (MAP) 107/66 (80)   


 


Pulse Ox 97   


 


O2 Delivery Nasal Cannula Room Air  


 


O2 Flow Rate 2.0   














Intake and Output   


 


 2/16/22 2/16/22 2/17/22





 15:00 23:00 07:00


 


Intake Total 50 ml 1670 ml 900 ml


 


Output Total  50 ml 600 ml


 


Balance 50 ml 1620 ml 300 ml











Justifications for Admission


General Conditions


Other justification for admit:  


Intractable foot pain, non-weight-bearing





Other Justification


Left ankle fracture











RAMAN MILES DP               Feb 17, 2022 17:51

## 2022-02-17 NOTE — PDOC
[facility] PROGRESS NOTE


Date of Service


DOS:


DATE: 2/17/22 


TIME: 13:40





Chief Complaint


Chief Complaint


A/P:


Left ankle pain -s/p ORIF November 2021. 2/16/22 - First tarsometatarsal joint 

fusion, deltoid ligament repair, PT tendon repair, syndesmotic joint 

stabilization, hardware removal


Unintentional weight loss - just had repeat colonoscopy 2 weeks ago with Dr. Paul


S/p Gastric Bypass 2004


Alcohol use disorder - no history of admission for withdrawal. Will place on 

CIWA post-operatively.





FEN - Regular diet


PPX - lovenox


FULL CODE


Dispo - will likely need acute rehab vs SNF as he will likely have non-

weightbearing status for 6 weeks





History of Present Illness


History of Present Illness


Mr Smith is a 65 yo male with PMHx s/p gastric bypass, s/p bilateral hip 

replacement, and recent complicated ankle fracture in October 2021 status post 

repair 11/2/2021 who comes in with persistent left ankle pain and instability.


Being seen today for preoperative physical for left ankle hardware removal 

deltoid ligament repair spring ligament repair first tarsometatarsal joint 

fusion possible flexor digitorum longus transfer.


He is s/p gastric bypass in 2004, bilateral hip placements in 2005.  Patient is 

Covid vaccinated with Pfizer vaccine.  Status post ORIF left ankle 11/2/2021.  

He is lost 30 pounds since his ankle surgery he and his wife think due to pain. 

Due to his weight loss Dr. Brandon referred him to Dr. Mak and he had a 

colonoscopy approximately 2 weeks ago.


Seen bedside.  Had some vagal response with IV insertion this morning.  No 

recent travel or sick contacts.  Other than above weight loss and left ankle 

persistent pain and development of flatfoot he has no other complaints.


10/2021 EKG Sinus rhythm 59 bpm, left axis deviation, first-degree AV block with

HI interval 202, QTc 46, T wave inversion lead III and aVF, no ST elevation or 

ST depression


Rapid covid 19 negative, no other labs at this time.





2/17: Hb 10.6, labs otherwise stable albumin 3.1.  Feels like his pain is 

reasonably controlled.  He is asking not to do rehabilitation at the same 

facility he did previously.  No chest pain or shortness of breath





Vitals/I&O


Vitals/I&O:





                                   Vital Signs








  Date Time  Temp Pulse Resp B/P (MAP) Pulse Ox O2 Delivery O2 Flow Rate FiO2


 


2/17/22 12:32      Room Air  


 


2/17/22 11:08 98.5 64 18 131/61 (84) 97  2.0 





 98.5       














                                    I & O   


 


 2/16/22 2/16/22 2/17/22





 15:00 23:00 07:00


 


Intake Total 50 ml 1670 ml 900 ml


 


Output Total  50 ml 600 ml


 


Balance 50 ml 1620 ml 300 ml











Physical Exam


General:  Alert, Oriented X3, Cooperative, No acute distress


Lungs:  Clear


Abdomen:  Normal bowel sounds, Soft, No tenderness, No hepatosplenomegaly, No 

masses


Extremities:  No clubbing, No cyanosis, No edema, Normal pulses, Other (left 

ankle tender and swollen)


Skin:  No rashes, No breakdown, No significant lesion





Labs


Labs:





Laboratory Tests








Test


 2/17/22


06:25


 


White Blood Count


 7.2 x10^3/uL


(4.0-11.0)


 


Red Blood Count


 4.01 x10^6/uL


(4.30-5.70)


 


Hemoglobin


 10.6 g/dL


(13.0-17.5)


 


Hematocrit


 31.7 %


(39.0-53.0)


 


Mean Corpuscular Volume 79 fL () 


 


Mean Corpuscular Hemoglobin 27 pg (25-35) 


 


Mean Corpuscular Hemoglobin


Concent 34 g/dL


(31-37)


 


Red Cell Distribution Width


 14.9 %


(11.5-14.5)


 


Platelet Count


 162 x10^3/uL


(140-400)


 


Neutrophils (%) (Auto) 74 % (31-73) 


 


Lymphocytes (%) (Auto) 16 % (24-48) 


 


Monocytes (%) (Auto) 10 % (0-9) 


 


Eosinophils (%) (Auto) 0 % (0-3) 


 


Basophils (%) (Auto) 0 % (0-3) 


 


Neutrophils # (Auto)


 5.3 x10^3/uL


(1.8-7.7)


 


Lymphocytes # (Auto)


 1.2 x10^3/uL


(1.0-4.8)


 


Monocytes # (Auto)


 0.7 x10^3/uL


(0.0-1.1)


 


Eosinophils # (Auto)


 0.0 x10^3/uL


(0.0-0.7)


 


Basophils # (Auto)


 0.0 x10^3/uL


(0.0-0.2)


 


Sodium Level


 143 mmol/L


(136-145)


 


Potassium Level


 3.7 mmol/L


(3.5-5.1)


 


Chloride Level


 106 mmol/L


()


 


Carbon Dioxide Level


 29 mmol/L


(21-32)


 


Anion Gap 8 (6-14) 


 


Blood Urea Nitrogen 9 mg/dL (8-26) 


 


Creatinine


 0.7 mg/dL


(0.7-1.3)


 


Estimated GFR


(Cockcroft-Gault) 112.1 





 


BUN/Creatinine Ratio 13 (6-20) 


 


Glucose Level


 96 mg/dL


(70-99)


 


Calcium Level


 8.0 mg/dL


(8.5-10.1)


 


Total Bilirubin


 0.5 mg/dL


(0.2-1.0)


 


Aspartate Amino Transf


(AST/SGOT) 12 U/L (15-37) 





 


Alanine Aminotransferase


(ALT/SGPT) 25 U/L (16-63) 





 


Alkaline Phosphatase


 56 U/L


()


 


Total Protein


 6.0 g/dL


(6.4-8.2)


 


Albumin


 3.1 g/dL


(3.4-5.0)


 


Albumin/Globulin Ratio 1.1 (1.0-1.7) 











Comment


Review of Relevant


I have reviewed the following items alfonzo (where applicable) has been applied.


Medications:





Current Medications








 Medications


  (Trade)  Dose


 Ordered  Sig/Mitchell


 Route


 PRN Reason  Start Time


 Stop Time Status Last Admin


Dose Admin


 


 Acetaminophen


  (Tylenol)  650 mg  1X  ONCE


 PO


   2/16/22 15:30


 2/16/22 15:31 DC 2/16/22 16:08





 


 Oxycodone/


 Acetaminophen


  (Percocet 5/325)  1 tab  1X  ONCE


 PO


   2/16/22 15:45


 2/16/22 15:46 DC 2/16/22 16:08





 


 Acetaminophen/


 Hydrocodone Bitart


  (Lortab 5/325)  1 tab  PRN Q4HRS  PRN


 PO


 MILD PAIN 1-3  2/16/22 16:00


    2/17/22 12:02





 


 Acetaminophen/


 Hydrocodone Bitart


  (Lortab 5/325)  2 tab  PRN Q4HRS  PRN


 PO


 MODERATE PAIN, SEVERE PAIN  2/16/22 16:00


    2/17/22 04:20





 


 Senna/Docusate


 Sodium


  (Senna Plus)  1 tab  BID


 PO


   2/16/22 21:00


    2/17/22 08:30





 


 Enoxaparin Sodium


  (Lovenox 40mg


 Syringe)  40 mg  Q24H


 SQ


   2/17/22 09:00


    2/17/22 08:30





 


 Cefazolin Sodium/


 Dextrose  50 ml @ 


 100 mls/hr  1X  ONCE


 IV


   2/16/22 18:30


 2/16/22 18:59 DC 2/16/22 21:07














Justifications for Admission


General Conditions


Other justification for admit:  


Intractable foot pain, non-weight-bearing





Other Justification


Left ankle fracture











HERNÁN ROUSSEAU MD        Feb 17, 2022 13:43

## 2022-02-18 VITALS — SYSTOLIC BLOOD PRESSURE: 120 MMHG | DIASTOLIC BLOOD PRESSURE: 69 MMHG

## 2022-02-18 VITALS — DIASTOLIC BLOOD PRESSURE: 59 MMHG | SYSTOLIC BLOOD PRESSURE: 120 MMHG

## 2022-02-18 VITALS — DIASTOLIC BLOOD PRESSURE: 69 MMHG | SYSTOLIC BLOOD PRESSURE: 138 MMHG

## 2022-02-18 VITALS — SYSTOLIC BLOOD PRESSURE: 108 MMHG | DIASTOLIC BLOOD PRESSURE: 61 MMHG

## 2022-02-18 VITALS — SYSTOLIC BLOOD PRESSURE: 115 MMHG | DIASTOLIC BLOOD PRESSURE: 56 MMHG

## 2022-02-18 RX ADMIN — HYDROCODONE BITARTRATE AND ACETAMINOPHEN PRN TAB: 5; 325 TABLET ORAL at 06:12

## 2022-02-18 RX ADMIN — GABAPENTIN SCH MG: 100 CAPSULE ORAL at 19:29

## 2022-02-18 RX ADMIN — HYDROCODONE BITARTRATE AND ACETAMINOPHEN PRN TAB: 5; 325 TABLET ORAL at 10:59

## 2022-02-18 RX ADMIN — FOLIC ACID SCH MG: 1 TABLET ORAL at 08:18

## 2022-02-18 RX ADMIN — CHOLECALCIFEROL CAP 125 MCG (5000 UNIT) SCH UNIT: 125 CAP at 08:19

## 2022-02-18 RX ADMIN — SENNOSIDES AND DOCUSATE SODIUM SCH TAB: 8.6; 5 TABLET ORAL at 08:19

## 2022-02-18 RX ADMIN — HYDROCODONE BITARTRATE AND ACETAMINOPHEN PRN TAB: 5; 325 TABLET ORAL at 15:16

## 2022-02-18 RX ADMIN — ENOXAPARIN SODIUM SCH MG: 40 INJECTION SUBCUTANEOUS at 08:20

## 2022-02-18 RX ADMIN — SENNOSIDES AND DOCUSATE SODIUM SCH TAB: 8.6; 5 TABLET ORAL at 19:29

## 2022-02-18 RX ADMIN — ZINC SULFATE CAP 220 MG (50 MG ELEMENTAL ZN) SCH MG: 220 (50 ZN) CAP at 08:18

## 2022-02-18 RX ADMIN — GABAPENTIN SCH MG: 100 CAPSULE ORAL at 08:18

## 2022-02-18 RX ADMIN — CHOLECALCIFEROL CAP 125 MCG (5000 UNIT) SCH UNIT: 125 CAP at 19:29

## 2022-02-18 RX ADMIN — HYDROCODONE BITARTRATE AND ACETAMINOPHEN PRN TAB: 5; 325 TABLET ORAL at 00:18

## 2022-02-18 RX ADMIN — Medication SCH MG: at 08:19

## 2022-02-18 RX ADMIN — MAGNESIUM OXIDE TAB 400 MG (241.3 MG ELEMENTAL MG) SCH MG: 400 (241.3 MG) TAB at 08:18

## 2022-02-18 RX ADMIN — PSYLLIUM HUSK SCH PKT: 3.4 POWDER ORAL at 19:29

## 2022-02-18 RX ADMIN — HYDROCODONE BITARTRATE AND ACETAMINOPHEN PRN TAB: 5; 325 TABLET ORAL at 19:36

## 2022-02-18 RX ADMIN — GABAPENTIN SCH MG: 100 CAPSULE ORAL at 13:54

## 2022-02-18 RX ADMIN — HYDROCODONE BITARTRATE AND ACETAMINOPHEN PRN TAB: 5; 325 TABLET ORAL at 23:39

## 2022-02-18 RX ADMIN — MULTIPLE VITAMINS W/ MINERALS TAB SCH TAB: TAB at 08:19

## 2022-02-18 NOTE — NUR
SW following. Discussed with RN, pt from home with spouse, room air, regular diet, COVID-19 
negative. OT recommending home. PT not yet worked with pt. Pt had surgery on 2/16. [facility] will 
continue to follow.

-------------------------------------------------------------------------------

Addendum: 02/18/22 at 1615 by LEILANI LOCKETT

-------------------------------------------------------------------------------

Pt is current with [facility]. Dr. Swift notified for home health discharge 
orders upon discharge. Anticipate tomorrow.

## 2022-02-18 NOTE — PDOC
[facility] PROGRESS NOTE


Date of Service


DOS:


DATE: 2/18/22 


TIME: 11:00





Chief Complaint


Chief Complaint


A/P:


Left ankle pain -s/p ORIF November 2021. 2/16/22 - First tarsometatarsal joint 

fusion, deltoid ligament repair, PT tendon repair, syndesmotic joint 

stabilization, hardware removal


Unintentional weight loss - just had repeat colonoscopy 2 weeks ago with Dr. Paul


S/p Gastric Bypass 2004


Alcohol use disorder - no history of admission for withdrawal. Will place on 

CIWA post-operatively.





FEN - Regular diet


PPX - lovenox


FULL CODE


Dispo - will likely need acute rehab vs SNF as he will likely have non-

weightbearing status for 6 weeks





History of Present Illness


History of Present Illness


Mr Smith is a 65 yo male with PMHx s/p gastric bypass, s/p bilateral hip 

replacement, and recent complicated ankle fracture in October 2021 status post 

repair 11/2/2021 who comes in with persistent left ankle pain and instability.


Being seen today for preoperative physical for left ankle hardware removal 

deltoid ligament repair spring ligament repair first tarsometatarsal joint 

fusion possible flexor digitorum longus transfer.


He is s/p gastric bypass in 2004, bilateral hip placements in 2005.  Patient is 

Covid vaccinated with Pfizer vaccine.  Status post ORIF left ankle 11/2/2021.  

He is lost 30 pounds since his ankle surgery he and his wife think due to pain. 

Due to his weight loss Dr. Brandon referred him to Dr. Mak and he had a 

colonoscopy approximately 2 weeks ago.


Seen bedside.  Had some vagal response with IV insertion this morning.  No 

recent travel or sick contacts.  Other than above weight loss and left ankle 

persistent pain and development of flatfoot he has no other complaints.


10/2021 EKG Sinus rhythm 59 bpm, left axis deviation, first-degree AV block with

[place] interval 202, QTc 46, T wave inversion lead III and aVF, no ST elevation or 

ST depression


Rapid covid 19 negative, no other labs at this time.





2/18


Patient seen and examined at bedside this morning in no apparent distress


Patient says he is recovering after a revision operation for a broken left ankle

sustained due to a fall caused by ETOH intake


Patient says he is feeling well after his surgery 


Surgery was performed by Dr. Bradley on 2/16/22 and is included below: 


First tarsometatarsal joint fusion, Deltoid ligament repair, PT tendon repair, 

Syndesmotic joint stabilization, Hardware removal


Discussed case with RN


Chart reviewed








2/17: Hb 10.6, labs otherwise stable albumin 3.1.  Feels like his pain is 

reasonably controlled.  He is asking not to do rehabilitation at the same 

facility he did previously.  No chest pain or shortness of breath





Vitals/I&O


Vitals/I&O:





                                   Vital Signs








  Date Time  Temp Pulse Resp B/P (MAP) Pulse Ox O2 Delivery O2 Flow Rate FiO2


 


2/18/22 07:30 97.7 61 20 108/61 (77) 95 Room Air  





 97.7       


 


2/17/22 15:15       2.0 














                                    I & O   


 


 2/17/22 2/17/22 2/18/22





 15:00 23:00 07:00


 


Intake Total 340 ml 500 ml 240 ml


 


Output Total 425 ml 2600 ml 850 ml


 


Balance -85 ml -2100 ml -610 ml











Physical Exam


General:  Alert, Oriented X3, Cooperative, No acute distress


Lungs:  Clear


Abdomen:  Normal bowel sounds, Soft, No tenderness, No hepatosplenomegaly, No 

masses


Extremities:  No clubbing, No cyanosis, No edema, Normal pulses, Other (left 

ankle tender and swollen)


Skin:  No rashes, No breakdown, No significant lesion





Assessment and Plan


Assessmemt and Plan


Assessment


Post-op day 2 from complex left ankle revision surgery performed 2/16/22


PT tendinitis


Deltoid ligament insufficiency


Pes planus


Externally reduced syndesmotic joint





Plan


Wound care


Pain management


PT/OT


DVT prophylaxis


Full Code


I spoke with podiatrist Dr. Eric he would like to ambulate the patient with 

physical therapy for a day and then we will discharge tomorrow





Comment


Review of Relevant


I have reviewed the following items alfonzo (where applicable) has been applied.


Medications:





Current Medications








 Medications


  (Trade)  Dose


 Ordered  Sig/Mitchell


 Route


 PRN Reason  Start Time


 Stop Time Status Last Admin


Dose Admin


 


 Multivitamins


  (Thera M Plus)  1 tab  DAILY


 PO


   2/17/22 14:00


    2/18/22 08:19





 


 Folic Acid


  (Folic Acid)  1 mg  DAILY


 PO


   2/17/22 14:00


    2/18/22 08:18





 


 Thiamine


 Mononitrate


  (Vitamin B-1)  100 mg  DAILY


 PO


   2/17/22 14:00


    2/18/22 08:19





 


 Vitamin D


  (Vitamin D3)  5,000 unit  BID


 PO


   2/17/22 21:00


    2/18/22 08:19





 


 Zinc Sulfate


  (Orazinc)  220 mg  DAILY


 PO


   2/18/22 09:00


    2/18/22 08:18





 


 Magnesium Oxide


  (Magnesium Oxide)  400 mg  DAILY


 PO


   2/18/22 09:00


    2/18/22 08:18





 


 Gabapentin


  (Neurontin)  100 mg  TID


 PO


   2/17/22 21:00


    2/18/22 08:18














Justifications for Admission


General Conditions


Other justification for admit:  


Intractable foot pain, non-weight-bearing





Other Justification


Left ankle fracture











SHIMON DEJESUS III DO           Feb 18, 2022 11:05

## 2022-02-19 VITALS — SYSTOLIC BLOOD PRESSURE: 129 MMHG | DIASTOLIC BLOOD PRESSURE: 62 MMHG

## 2022-02-19 VITALS — SYSTOLIC BLOOD PRESSURE: 108 MMHG | DIASTOLIC BLOOD PRESSURE: 59 MMHG

## 2022-02-19 RX ADMIN — MULTIPLE VITAMINS W/ MINERALS TAB SCH TAB: TAB at 08:19

## 2022-02-19 RX ADMIN — MAGNESIUM OXIDE TAB 400 MG (241.3 MG ELEMENTAL MG) SCH MG: 400 (241.3 MG) TAB at 08:18

## 2022-02-19 RX ADMIN — HYDROCODONE BITARTRATE AND ACETAMINOPHEN PRN TAB: 5; 325 TABLET ORAL at 03:47

## 2022-02-19 RX ADMIN — SENNOSIDES AND DOCUSATE SODIUM SCH TAB: 8.6; 5 TABLET ORAL at 08:18

## 2022-02-19 RX ADMIN — ZINC SULFATE CAP 220 MG (50 MG ELEMENTAL ZN) SCH MG: 220 (50 ZN) CAP at 08:18

## 2022-02-19 RX ADMIN — GABAPENTIN SCH MG: 100 CAPSULE ORAL at 13:05

## 2022-02-19 RX ADMIN — Medication SCH MG: at 08:18

## 2022-02-19 RX ADMIN — GABAPENTIN SCH MG: 100 CAPSULE ORAL at 08:18

## 2022-02-19 RX ADMIN — HYDROCODONE BITARTRATE AND ACETAMINOPHEN PRN TAB: 5; 325 TABLET ORAL at 13:05

## 2022-02-19 RX ADMIN — FOLIC ACID SCH MG: 1 TABLET ORAL at 08:19

## 2022-02-19 RX ADMIN — ENOXAPARIN SODIUM SCH MG: 40 INJECTION SUBCUTANEOUS at 08:20

## 2022-02-19 RX ADMIN — CHOLECALCIFEROL CAP 125 MCG (5000 UNIT) SCH UNIT: 125 CAP at 08:18

## 2022-02-19 RX ADMIN — HYDROCODONE BITARTRATE AND ACETAMINOPHEN PRN TAB: 5; 325 TABLET ORAL at 08:18

## 2022-02-19 NOTE — NUR
Patient discharge home with self care today,via wheelchair, accompanied by aid. Patient is 
stable, IV removed, and discharge paperwork given to patient. Patient verbalized 
understanding of followup and discharge instruction. Patient have followup appointment with 
Dr. Eric on March 1st at office.

## 2022-02-19 NOTE — PDOC
[facility] PROGRESS NOTE


Date of Service


DOS:


DATE: 2/19/22 


TIME: 10:37





Chief Complaint


Chief Complaint


A/P:


Left ankle pain -s/p ORIF November 2021. 2/16/22 - First tarsometatarsal joint 

fusion, deltoid ligament repair, PT tendon repair, syndesmotic joint 

stabilization, hardware removal


Unintentional weight loss - just had repeat colonoscopy 2 weeks ago with Dr. Paul


S/p Gastric Bypass 2004


Alcohol use disorder - no history of admission for withdrawal. Will place on 

CIWA post-operatively.





FEN - Regular diet


PPX - lovenox


FULL CODE


Dispo - will likely need acute rehab vs SNF as he will likely have non-

weightbearing status for 6 weeks





History of Present Illness


History of Present Illness


Mr Smith is a 67 yo male with PMHx s/p gastric bypass, s/p bilateral hip 

replacement, and recent complicated ankle fracture in October 2021 status post 

repair 11/2/2021 who comes in with persistent left ankle pain and instability.


Being seen today for preoperative physical for left ankle hardware removal 

deltoid ligament repair spring ligament repair first tarsometatarsal joint 

fusion possible flexor digitorum longus transfer.


He is s/p gastric bypass in 2004, bilateral hip placements in 2005.  Patient is 

Covid vaccinated with Pfizer vaccine.  Status post ORIF left ankle 11/2/2021.  

He is lost 30 pounds since his ankle surgery he and his wife think due to pain. 

Due to his weight loss Dr. Brandon referred him to Dr. Mak and he had a 

colonoscopy approximately 2 weeks ago.


Seen bedside.  Had some vagal response with IV insertion this morning.  No 

recent travel or sick contacts.  Other than above weight loss and left ankle 

persistent pain and development of flatfoot he has no other complaints.


10/2021 EKG Sinus rhythm 59 bpm, left axis deviation, first-degree AV block with

[place] interval 202, QTc 46, T wave inversion lead III and aVF, no ST elevation or 

ST depression


Rapid covid 19 negative, no other labs at this time.





2/19


Post-Op Day 3


Patient seen and examined at bedside this morning in no apparent distress


Patient still recovering after revision operation for a broken left ankle


Patient says he is feeling well after his surgery 


Patient complains of constipation but refused suppository (per RN note) last 

night (2/18), prefers a Dulcolax pill - Agreed to provide patient Dulcolax


Plan to discharge this afternoon after bowel movement


Discussed case with RN


Chart reviewed





2/18


Patient seen and examined at bedside this morning in no apparent distress


Patient says he is recovering after a revision operation for a broken left ankle

sustained due to a fall caused by ETOH intake


Patient says he is feeling well after his surgery 


Surgery was performed by Dr. Bradley on 2/16/22 and is included below: 


First tarsometatarsal joint fusion, Deltoid ligament repair, PT tendon repair, 

Syndesmotic joint stabilization, Hardware removal


Discussed case with RN


Chart reviewed








2/17: Hb 10.6, labs otherwise stable albumin 3.1.  Feels like his pain is 

reasonably controlled.  He is asking not to do rehabilitation at the same 

facility he did previously.  No chest pain or shortness of breath





Vitals/I&O


Vitals/I&O:





                                   Vital Signs








  Date Time  Temp Pulse Resp B/P (MAP) Pulse Ox O2 Delivery O2 Flow Rate FiO2


 


2/19/22 09:06      Room Air  


 


2/19/22 08:05       2.0 


 


2/19/22 07:00 98.2 64 17 129/62 (84) 97   





 98.2       














                                    I & O   


 


 2/18/22 2/18/22 2/19/22





 15:00 23:00 07:00


 


Intake Total   450 ml


 


Output Total 1000 ml  


 


Balance -1000 ml  450 ml











Physical Exam


General:  Alert, Oriented X3, Cooperative, No acute distress


Lungs:  Clear


Abdomen:  Normal bowel sounds, Soft, No tenderness, No hepatosplenomegaly, No 

masses


Extremities:  No clubbing, No cyanosis, No edema, Normal pulses, Other (left 

ankle tender and swollen)


Skin:  No rashes, No breakdown, No significant lesion





Assessment and Plan


Assessmemt and Plan


Assessment


Post-op day 3 from complex left ankle revision surgery performed 2/16/22


PT tendinitis


Deltoid ligament insufficiency


Pes planus


Externally reduced syndesmotic joint


Constipation





Plan


Gave Dulcolax for constipation as patient would like to have a BM before 

discharge


Wound care


Pain management


PT/OT


DVT prophylaxis


Full Code


Spoke with podiatrist Dr. Eric - Plan to ambulate the patient with PT and then 

discharge this afternoon





Comment


Review of Relevant


I have reviewed the following items alfonzo (where applicable) has been applied.


Medications:





Current Medications








 Medications


  (Trade)  Dose


 Ordered  Sig/Mitchell


 Route


 PRN Reason  Start Time


 Stop Time Status Last Admin


Dose Admin


 


 Polyethylene


 Glycol


  (miraLAX PACKET)  17 gm  PRN DAILY  PRN


 PO


 CONSTIPATION - 2ND CHOICE  2/18/22 13:30


    2/18/22 16:20





 


 Bisacodyl


  (Dulcolax Tab)  10 mg  1X  ONCE


 PO


   2/19/22 09:45


 2/19/22 09:46 DC 2/19/22 10:10














Justifications for Admission


General Conditions


Other justification for admit:  


Intractable foot pain, non-weight-bearing





Other Justification


Left ankle fracture











SHIMON DEJESUS III DO           Feb 19, 2022 10:41

## 2022-02-19 NOTE — DS
DATE OF DISCHARGE: 02/19/2022

ADMISSION DIAGNOSIS:  Left ankle fracture.



DISCHARGE DIAGNOSES:  Postoperative day #3, first transmetatarsal joint fusion, 

deltoid ligament repair, PT tendon repair, syndesmotic joint stabilization and 

hardware removal.



HOSPITAL COURSE:  The patient is a pleasant middle-aged male who fell and 

fractured his ankle.  We consulted Dr. Eric.  He was taken to Surgery for the 

above procedure.  Today, I saw and examined him.  He is at his baseline, wanted 

to go home.  We plan to discharge with close outpatient followup with Dr. Eric.



DISPOSITION:  Home.



ACTIVITY:  As tolerated.



DIET:  Low-sodium.



DISCHARGE MEDICATIONS:  Please see the MRAD.  Gabapentin 100 t.i.d., hydrocodone

5 mg q. 6 hours, p.r.n. Tylenol, vitamin D, magnesium citrate 100 b.i.d., 

vitamins, and zinc 50 a day.



TOTAL TIME:  Thirty-two minutes.







[facility]

DR: Judi   DD: 02/19/2022 11:06

DT: 02/19/2022 11:17   TID: [id number] Detail Level: Simple Detail Level: Detailed

## 2022-05-04 ENCOUNTER — HOSPITAL ENCOUNTER (OUTPATIENT)
Dept: HOSPITAL 61 - SURG | Age: 67
Discharge: HOME | End: 2022-05-04
Attending: STUDENT IN AN ORGANIZED HEALTH CARE EDUCATION/TRAINING PROGRAM
Payer: MEDICARE

## 2022-05-04 VITALS — SYSTOLIC BLOOD PRESSURE: 127 MMHG | DIASTOLIC BLOOD PRESSURE: 67 MMHG

## 2022-05-04 VITALS — HEIGHT: 70 IN | BODY MASS INDEX: 25.82 KG/M2 | WEIGHT: 180.34 LBS

## 2022-05-04 VITALS — SYSTOLIC BLOOD PRESSURE: 108 MMHG | DIASTOLIC BLOOD PRESSURE: 68 MMHG

## 2022-05-04 DIAGNOSIS — Z72.89: ICD-10-CM

## 2022-05-04 DIAGNOSIS — G47.30: ICD-10-CM

## 2022-05-04 DIAGNOSIS — Z87.891: ICD-10-CM

## 2022-05-04 DIAGNOSIS — M20.42: Primary | ICD-10-CM

## 2022-05-04 DIAGNOSIS — Z79.899: ICD-10-CM

## 2022-05-04 DIAGNOSIS — Z98.890: ICD-10-CM

## 2022-05-04 PROCEDURE — A6443 CONFORM BAND N/S W>=3"<5"/YD: HCPCS

## 2022-05-04 PROCEDURE — C1713 ANCHOR/SCREW BN/BN,TIS/BN: HCPCS

## 2022-05-04 PROCEDURE — 28285 REPAIR OF HAMMERTOE: CPT

## 2022-05-04 PROCEDURE — A4322 IRRIGATION SYRINGE: HCPCS

## 2022-05-04 PROCEDURE — A6449 LT COMPRES BAND >=3" <5"/YD: HCPCS

## 2022-05-04 PROCEDURE — A4657 SYRINGE W/WO NEEDLE: HCPCS

## 2022-05-04 PROCEDURE — 73630 X-RAY EXAM OF FOOT: CPT

## 2022-05-04 PROCEDURE — A4930 STERILE, GLOVES PER PAIR: HCPCS

## 2022-05-04 PROCEDURE — A6253 ABSORPT DRG > 48 SQ IN W/O B: HCPCS

## 2022-05-04 PROCEDURE — A6402 STERILE GAUZE <= 16 SQ IN: HCPCS

## 2022-05-04 PROCEDURE — 28296 COR HLX VLGS DSTL MTAR OSTEO: CPT

## 2022-05-04 PROCEDURE — C1769 GUIDE WIRE: HCPCS

## 2022-05-04 RX ADMIN — FENTANYL CITRATE PRN MCG: 50 INJECTION INTRAMUSCULAR; INTRAVENOUS at 11:49

## 2022-05-04 RX ADMIN — FENTANYL CITRATE PRN MCG: 50 INJECTION INTRAMUSCULAR; INTRAVENOUS at 12:04

## 2022-05-04 NOTE — SSS
DATE OF SERVICE: 05/04/2022

ADMIT DATE: 05/04/2022

CHIEF COMPLAINT:  Hammertoe preoperative evaluation.



HISTORY OF PRESENT ILLNESS:  The patient is a pleasant 66-year-old male who is 

going for surgery today for a hammertoe.  We have been requested for 

preoperative evaluation.



PAST MEDICAL HISTORY:  Benign.



ALLERGIES:  None.



FAMILY HISTORY:  Diabetes.



SOCIAL HISTORY:  He does not drink, smoke or take drugs.  He is .



MEDICATIONS:  Reviewed, please refer to the MRAD.  He basically on 

over-the-counter supplements including iron, Tylenol, calcium, magnesium, zinc, 

protein supplements, vitamin B12, vitamin D, and multiple vitamins.



REVIEW OF SYSTEMS:

GENERAL:  No history of weight change, weakness or fevers.

SKIN:  No bruising, hair changes or rashes.

EYES:  No blurred, double or loss of vision.

NOSE AND THROAT:  No history of nosebleeds, hoarseness or sore throat.

HEART:  No history of palpitations, chest pain or shortness of breath on 

exertion.

LUNGS:  Denies cough, hemoptysis, wheezing or shortness of breath.

GASTROINTESTINAL:  Denies changes in appetite, nausea, vomiting, diarrhea or 

constipation.

GENITOURINARY:  No history of frequency, urgency, hesitancy or nocturia.

NEUROLOGIC:  Denies history of numbness, tingling, tremor or weakness.

PSYCHIATRIC:  No history of panic, anxiety or depression.

ENDOCRINE:  No history of heat or cold intolerance, polyuria or polydipsia.

EXTREMITIES:  Denies muscle weakness, joint pain, pain on walking or stiffness.



PHYSICAL EXAMINATION:

VITALS:  Within normal limits and are stable.

GENERAL:  No apparent distress.  Alert and oriented.

HEENT:  Normocephalic atraumatic, external auditory canals are patent.

EYES:  Extraocular muscles are intact, pupils are equally round and reactive to 

light and accommodation.

MUSCULOSKELETAL:  Well developed, well nourished, good range of motion.

ENDOCRINE:  No thyromegaly was palpated.

LYMPHATICS:  No cervical chain or axillary nodes were noted.

HEMATOPOIETIC:  No bruising.

NECK:  Supple, no JVD, no thyromegaly was noted.

LUNGS:  Clear to auscultation in all lung fields without rhonchi or wheezing.

HEART:  RRR, S1, S2 present.  Peripheral pulses intact, no obvious murmurs were 

noted.

ABDOMEN:  Soft, nontender.  Positive bowel sounds no organomegaly, normal bowel 

sounds.

EXTREMITIES:  Without any cyanosis, clubbing, or edema.  Pedal pulses intact, 

Homans sign is negative.

NEUROLOGIC:  Normal speech, normal tone.  A and O x 3, moves all extremities, no

obvious focal deficits.

PSYCHIATRIC:  Normal affect, normal mood.  Stable.

SKIN:  No ulcerations or rashes, good skin turgor, no jaundice.

VASCULAR:  Good capillary refill, neurovascular bundle appears to be intact.



ASSESSMENT AND PLAN:  Vincent in a middle-aged 66-year-old male who seems to 

be a very clinically stable, has a very little past medical history and is only 

on over-the-counter medications.  He seems at low for intraoperative or 

postoperative complications.  He is cleared for surgery.







[facility]

DR: CRESCENCIO/reese   DD: 05/04/2022 08:41

DT: 05/04/2022 08:50   TID: [id number]

## 2022-05-04 NOTE — PDOC4
OPERATIVE NOTE


Date:


Date:  May 4, 2022





Pre-Op Diagnosis:


Left bunion, second hammertoe contracture, semirigid





Post-Op Diagnosis:


Same as above





Procedure Performed:


Left distal bunion osteotomy, second PIPJ arthrodesis





Surgeon:


Raman Miles DPM





Anesthesia Type:


General





Blood Loss:


10 cc





Specimans Obtained:


None





Findings:


Postoperatively, reduced first intermetatarsal angle, restored fibular sesamoid 

position, adequate first MTPJ congruity.  Improved apposition at the second 

PIPJ.





Complications:


None





Operative Note:


Under mild sedation, patient was brought into the operating room and placed on 

operating table in a supine position.  A formal timeout confirming patient's 

identity, procedure, procedure site was carried out.  Following IV prophylactic 

antibiotics, general anesthesia, a well-padded left thigh tourniquet was placed 

on the left lower extremity.  Following alcohol skin prep, 10cc of 0.25% 

Marcaine plain was infiltrated into the left distal forefoot in a Prajapati block 

fashion and a second digital block fashion.  The left lower extremity was then 

scrubbed, prepped and draped using aseptic techniques.  The left lower extremity

was exsanguinated and then the tourniquet was inflated to 250 mill mercury.





Then the attention was directed to left distal forefoot.  A dorsal linear 

incision was made across the first MTPJ.  The incision was carried deep with 

combination of sharp and blunt dissection with care to protect and retract all 

the neurovascular bundles.  Straight linear capsulotomy was performed medial to 

the EHL.  The capsule was carefully elevated off the medial first metatarsal 

head and also the base of the proximal hallux.  Care was taken not to over 

dissect the soft tissue envelope to avoid AVN.  After the medial first metata

rsal head was fully visualized, the attention was directed to the dorsal lateral

first MTPJ.  Metzenbaum scissors was used to bluntly dissect and review the 

lateral capsule of the sesamoid complex.  A #15 blade was used to release the 

fibular sesamoid collateral ligament.  The fibular sesamoid was further freed 

from the surrounding soft tissue and was noted to reduce under the first 

metatarsal head.  Then the attention was directed to the medial eminence.  The 

medial eminence was minimally resected to create a flat osseous plane where a 

0.052'' K wire was used as an axis guide aiming towards fifth metatarsal head.  

Then a modified distal Rosales osteotomy was carried out around the first 

metatarsal head.  The distal fragment was easily translated laterally where the 

fibular sesamoid was reduced under the metatarsal head, the first MTPJ 

congruenty was restored.  Then a K wire was used to temporarily stabilize the 

distal fragment.  Then using standard AO technique, a partially-threaded 3.0 mm 

cannulated screw was placed from dorsal proximal to distal plantar aspect of the

first metatarsal head.  The hardware was noted adequate in length, position 

without any violation to the first MTPJ.  Then a sagittal saw was used to remove

the remaining medial eminence without steaking the head.  Then the surgical site

was irrigated with copious saline solution.  The medial capsule was repaired 

with 3-0 Vicryl with the forefoot loaded and hallux minimally adducted across 

the MTPJ.  The skin was closed with 4-0 Monocryl and 4-0 nylon.








Then the attention was directed to the dorsal second PIPJ.  two semielliptical 

skin incisions were carried out at the level of dorsal second PIPJ. The incision

was carried deep with a pair of Metzenbaum's scissors with care to protect and 

retract all the neurovascular bundles. At this time, the dorsal EDL was 

encountered and was traversed at the level of PIPJ. The extensor tendon was 

elevated off the bone and capsule. A transverse capsulotomy and collateral 

ligament release were performed over the PIPJ to expose the head of the proximal

second phalanx and the base of the middle phalanx. Then a sagittal saw was used 

to resect out the head of the second proximal phalanx and the base of the middle

phalanx. The surgical site was irrigated with copious saline solution.  The 

opposing surface of the PIPJ was fenestrated and opposed with a retrograde 

underwire for 2.0 mm fully threaded screw traversing the PIPJ and DIPJ.  Then a 

2.0 mm fully threaded screw was placed from distal to proximal over the underwir

e traversing the PIPJ. Adequate hardware position, purchase and alignment were 

confirmed with intraoperative x-ray.  The guidewire was removed. Then the EDL 

was repaired with 3-0 Vicryl. Then the incision was repaired with 4-0 nylon.  At

this time, tourniquet was deflated and adequate digital perfusion was noted to 

digits 1 through 5, left.





Postoperative anesthesia consisted of 10 cc of 0.25% Marcaine plain was 

infiltrated in a Prajapati block fashion and a second digital block fashion across 

the left forefoot.  Then the surgical site was dressed with Xeroform, gauze, 

ABD, Ace.





Patient tolerated procedure and anesthesia well with neurovascular status intact

and vital signs stable.  Patient was then transferred to PACU for continuous 

recovery.  Pending left foot 3 view x-ray.  Patient to be nonweightbearing while

protected in the E boot.











RAMAN MILES DPM                May 4, 2022 11:36

## 2022-05-04 NOTE — RAD
EXAM:  XR FOOT_LEFT 3 VIEWS 5/4/2022 11:38 AM



CLINICAL INDICATION:  Postop left



COMPARISON:  Left foot radiograph 4/25/2022



TECHNIQUE:  AP, oblique, and lateral views of the left foot



FINDINGS:  There are new surgical changes of great toe metatarsal head osteotomy and hammertoe deform
ity correction of the second PIP joint. Hardware appears intact. There is an unchanged plate and scre
w fixation device and interfragmentary screw traversing the first TMT joint. Incompletely evaluated h
ardware in the distal ankle.



The bones are diffusely demineralized. There is mild degenerative joint disease at the first and seco
nd MTP joints. Mild diffuse soft tissue swelling. Vascular calcifications are seen.



IMPRESSION:  

1. New surgical changes of great toe metatarsal head osteotomy and second hammertoe deformity correct
ion.

2. Unchanged first TMT joint arthrodesis.

3. No acute abnormality.



Electronically signed by: Mercedes Kelsey MD (5/4/2022 4:28 PM) [id number]